# Patient Record
Sex: MALE | Race: WHITE | NOT HISPANIC OR LATINO | Employment: OTHER | URBAN - METROPOLITAN AREA
[De-identification: names, ages, dates, MRNs, and addresses within clinical notes are randomized per-mention and may not be internally consistent; named-entity substitution may affect disease eponyms.]

---

## 2017-02-08 ENCOUNTER — TELEPHONE (OUTPATIENT)
Dept: MEDICAL GROUP | Facility: CLINIC | Age: 67
End: 2017-02-08

## 2017-03-09 DIAGNOSIS — I10 ESSENTIAL HYPERTENSION: ICD-10-CM

## 2017-03-09 RX ORDER — AMLODIPINE BESYLATE 5 MG/1
5 TABLET ORAL
Qty: 90 TAB | Refills: 3 | Status: SHIPPED | OUTPATIENT
Start: 2017-03-09 | End: 2017-03-10 | Stop reason: SDUPTHER

## 2017-03-10 ENCOUNTER — TELEPHONE (OUTPATIENT)
Dept: MEDICAL GROUP | Facility: CLINIC | Age: 67
End: 2017-03-10

## 2017-03-10 DIAGNOSIS — I10 ESSENTIAL HYPERTENSION: ICD-10-CM

## 2017-03-10 RX ORDER — AMLODIPINE BESYLATE 5 MG/1
5 TABLET ORAL
Qty: 90 TAB | Refills: 3 | Status: SHIPPED | OUTPATIENT
Start: 2017-03-10 | End: 2018-02-21 | Stop reason: SDUPTHER

## 2017-03-10 NOTE — TELEPHONE ENCOUNTER
Pharmacy requested a refill for amlodipine, which was approved, but it didn't go through to the pharmacy. Please re-send. Please call pt when it has been re-sent and verified that they received it. Thank you.

## 2017-04-03 ENCOUNTER — HOSPITAL ENCOUNTER (OUTPATIENT)
Dept: LAB | Facility: MEDICAL CENTER | Age: 67
End: 2017-04-03
Attending: UROLOGY
Payer: COMMERCIAL

## 2017-04-03 PROCEDURE — 84154 ASSAY OF PSA FREE: CPT

## 2017-04-03 PROCEDURE — 84153 ASSAY OF PSA TOTAL: CPT

## 2017-04-03 PROCEDURE — 36415 COLL VENOUS BLD VENIPUNCTURE: CPT

## 2017-04-05 ENCOUNTER — OFFICE VISIT (OUTPATIENT)
Dept: MEDICAL GROUP | Facility: CLINIC | Age: 67
End: 2017-04-05
Payer: COMMERCIAL

## 2017-04-05 VITALS
BODY MASS INDEX: 28.36 KG/M2 | RESPIRATION RATE: 18 BRPM | SYSTOLIC BLOOD PRESSURE: 150 MMHG | HEIGHT: 71 IN | HEART RATE: 60 BPM | OXYGEN SATURATION: 98 % | WEIGHT: 202.6 LBS | TEMPERATURE: 97.7 F | DIASTOLIC BLOOD PRESSURE: 90 MMHG

## 2017-04-05 DIAGNOSIS — J30.2 SEASONAL ALLERGIC RHINITIS, UNSPECIFIED ALLERGIC RHINITIS TRIGGER: ICD-10-CM

## 2017-04-05 DIAGNOSIS — E78.5 DYSLIPIDEMIA: ICD-10-CM

## 2017-04-05 DIAGNOSIS — R97.20 ELEVATED PSA: ICD-10-CM

## 2017-04-05 LAB
PSA FREE MFR SERPL: 23 %
PSA FREE SERPL-MCNC: 1.5 NG/ML
PSA SERPL-MCNC: 6.5 NG/ML (ref 0–4)

## 2017-04-05 PROCEDURE — 99214 OFFICE O/P EST MOD 30 MIN: CPT | Performed by: INTERNAL MEDICINE

## 2017-04-05 NOTE — MR AVS SNAPSHOT
"Catalino Sladeakiberry   2017 8:00 AM   Office Visit   MRN: [id number]    Department:  [facility]   Dept Phone:  [phone]    Description:  Male : 1950   Provider:  Rafael Liu D.O.           Reason for Visit     Follow-Up 6 months follow up       Allergies as of 2017     Allergen Noted Reactions    Nkda [No Known Drug Allergy] 2009         You were diagnosed with     Elevated PSA   [646944]       Dyslipidemia   [707646]       Seasonal allergic rhinitis, unspecified allergic rhinitis trigger   [1414417]         Vital Signs     Blood Pressure Pulse Temperature Respirations Height Weight    150/90 mmHg 60 36.5 °C (97.7 °F) 18 1.803 m (5' 11\") 91.899 kg (202 lb 9.6 oz)    Body Mass Index Oxygen Saturation Smoking Status             28.27 kg/m2 98% Never Smoker          Basic Information     Date Of Birth Sex Race Ethnicity Preferred Language    1950 Male White Non- English      Problem List              ICD-10-CM Priority Class Noted - Resolved    Seasonal allergies J30.2   9/3/2009 - Present    PE (PHYSICAL EXAM)    9/3/2009 - Present    Screening PSA (prostate specific antigen) Z12.5   9/3/2009 - Present    Dyslipidemia E78.5   9/3/2009 - Present    Elevated PSA R97.20   2015 - Present      Health Maintenance        Date Due Completion Dates    IMM DTaP/Tdap/Td Vaccine (1 - Tdap) 1969 ---    IMM ZOSTER VACCINE 2010 ---    IMM PNEUMOCOCCAL 65+ (ADULT) LOW/MEDIUM RISK SERIES (1 of 2 - PCV13) 2015 ---    COLONOSCOPY 2016 (Done)    Override on 2006: Done ([facility] )            Current Immunizations     No immunizations on file.      Below and/or attached are the medications your provider expects you to take. Review all of your home medications and newly ordered medications with your provider and/or pharmacist. Follow medication instructions as directed by your provider and/or pharmacist. Please keep your medication " list with you and share with your provider. Update the information when medications are discontinued, doses are changed, or new medications (including over-the-counter products) are added; and carry medication information at all times in the event of emergency situations     Allergies:  NKDA - (reactions not documented)               Medications  Valid as of: April 05, 2017 -  8:41 AM    Generic Name Brand Name Tablet Size Instructions for use    AmLODIPine Besylate (Tab) NORVASC 5 MG Take 1 Tab by mouth every day. TAKE 1 TAB BY MOUTH EVERY DAY.        Montelukast Sodium (Tab) SINGULAIR 10 MG Take 1 Tab by mouth every day.        .                 Medicines prescribed today were sent to:     [facility] [address] [place] [address], [place] - [address] [facility]    [address] [facility] [address]    Phone: [phone] Fax: [phone]    Open 24 Hours?: No      Medication refill instructions:       If your prescription bottle indicates you have medication refills left, it is not necessary to call your provider’s office. Please contact your pharmacy and they will refill your medication.    If your prescription bottle indicates you do not have any refills left, you may request refills at any time through one of the following ways: The online [employer] system (except Urgent Care), by calling your provider’s office, or by asking your pharmacy to contact your provider’s office with a refill request. Medication refills are processed only during regular business hours and may not be available until the next business day. Your provider may request additional information or to have a follow-up visit with you prior to refilling your medication.   *Please Note: Medication refills are assigned a new Rx number when refilled electronically. Your pharmacy may indicate that no refills were authorized even though a new prescription for the same medication is available at the pharmacy. Please request  the medicine by name with the pharmacy before contacting your provider for a refill.        Your To Do List     Future Labs/Procedures Complete By Expires    CBC WITH DIFFERENTIAL  As directed 4/6/2018    COMP METABOLIC PANEL  As directed 4/6/2018    LIPID PROFILE  As directed 4/6/2018    PROSTATE SPECIFIC AG DIAGNOSTIC  As directed 4/5/2018         OpenFin Access Code: [email or web address]  Expires: 5/3/2017  8:00 AM    OpenFin  A secure, online tool to manage your health information     [employer]’s OpenFin® is a secure, online tool that connects you to your personalized health information from the privacy of your home -- day or night - making it very easy for you to manage your healthcare. Once the activation process is completed, you can even access your medical information using the OpenFin raulito, which is available for free in the Apple Raulito store or Google Play store.     OpenFin provides the following levels of access (as shown below):   My Chart Features   [facility] Primary Care Doctor [facility]  Specialists [facility]  Urgent  Care [facility]  Primary Care  Doctor   Email your healthcare team securely and privately 24/7 X X X    Manage appointments: schedule your next appointment; view details of past/upcoming appointments X      Request prescription refills. X      View recent personal medical records, including lab and immunizations X X X X   View health record, including health history, allergies, medications X X X X   Read reports about your outpatient visits, procedures, consult and ER notes X X X X   See your discharge summary, which is a recap of your hospital and/or ER visit that includes your diagnosis, lab results, and care plan. X X       How to register for OpenFin:  1. Go to  [email or web address].  2. Click on the Sign Up Now box, which takes you to the New Member Sign Up page. You will need to provide the following information:  a. Enter your OpenFin Access Code exactly as it appears at the top of  this page. (You will not need to use this code after you’ve completed the sign-up process. If you do not sign up before the expiration date, you must request a new code.)   b. Enter your date of birth.   c. Enter your home email address.   d. Click Submit, and follow the next screen’s instructions.  3. Create a Virtual Computer ID. This will be your Virtual Computer login ID and cannot be changed, so think of one that is secure and easy to remember.  4. Create a Virtual Computer password. You can change your password at any time.  5. Enter your Password Reset Question and Answer. This can be used at a later time if you forget your password.   6. Enter your e-mail address. This allows you to receive e-mail notifications when new information is available in Virtual Computer.  7. Click Sign Up. You can now view your health information.    For assistance activating your Virtual Computer account, call [phone]

## 2017-04-06 NOTE — PROGRESS NOTES
CC: Catalino Holloway is a 67 y.o. male is suffering from   Chief Complaint   Patient presents with   • Follow-Up     6 months follow up          SUBJECTIVE:  1. Elevated PSA  Patient's here for follow-up has a history of an elevated PSA is being followed by Dr. Omer Headley, does have follow-up we've discussed the risks and benefits of possible treatment.     2. Dyslipidemia  Patient with a history of dyslipidemia and asked the patient recheck his cholesterol in 6 months    3. Seasonal allergic rhinitis, unspecified allergic rhinitis trigger  Have additionally recommended that the patient undergo CBC CMP testing is having problems seasonal allergies is using Singulair        Past social, family, history:   Social History   Substance Use Topics   • Smoking status: Never Smoker    • Smokeless tobacco: Never Used   • Alcohol Use: Yes      Comment: minimal         MEDICATIONS:    Current outpatient prescriptions:   •  amlodipine (NORVASC) 5 MG Tab, Take 1 Tab by mouth every day. TAKE 1 TAB BY MOUTH EVERY DAY., Disp: 90 Tab, Rfl: 3  •  montelukast (SINGULAIR) 10 MG Tab, Take 1 Tab by mouth every day., Disp: 90 Tab, Rfl: 3    PROBLEMS:  Patient Active Problem List    Diagnosis Date Noted   • Elevated PSA 01/16/2015   • Seasonal allergies 09/03/2009   • PE (PHYSICAL EXAM) 09/03/2009   • Screening PSA (prostate specific antigen) 09/03/2009   • Dyslipidemia 09/03/2009       REVIEW OF SYSTEMS:  Gen.:  No Nausea, Vomiting, fever, Chills.  Heart: No chest pain.  Lungs:  No shortness of Breath.  Psychological: Adan unusual Anxiety depression     PHYSICAL EXAM   Constitutional: Alert, cooperative, not in acute distress.  Cardiovascular:  Rate Rhythm is regular without murmurs rubs clicks.     Thorax & Lungs: Clear to auscultation, no wheezing, rhonchi, or rales  HENT: Normocephalic, Atraumatic.  Eyes: PERRLA, EOMI, Conjunctiva normal.   Neck: Trachia is midline no swelling of the thyroid.   Lymphatic: No  "lymphadenopathy noted.   Neurologic: Alert & oriented x 3, cranial nerves II through XII are intact, Normal motor function, Normal sensory function, No focal deficits noted.   Psychiatric: Affect normal, Judgment normal, Mood normal.     VITAL SIGNS:/90 mmHg  Pulse 60  Temp(Src) 36.5 °C (97.7 °F)  Resp 18  Ht 1.803 m (5' 11\")  Wt 91.899 kg (202 lb 9.6 oz)  BMI 28.27 kg/m2  SpO2 98%    Labs: Reviewed    Assessment:                                                     Plan:    1. Elevated PSA  Elevated PSA, followed by urology  - PROSTATE SPECIFIC AG DIAGNOSTIC; Future    2. Dyslipidemia  Recheck cholesterol  - LIPID PROFILE; Future    3. Seasonal allergic rhinitis, unspecified allergic rhinitis trigger  Continue singular labs ordered for 6 months  - COMP METABOLIC PANEL; Future  - CBC WITH DIFFERENTIAL; Future          "

## 2017-09-07 ENCOUNTER — HOSPITAL ENCOUNTER (OUTPATIENT)
Facility: MEDICAL CENTER | Age: 67
End: 2017-09-07
Payer: COMMERCIAL

## 2017-09-07 LAB
ALBUMIN SERPL BCP-MCNC: 4.2 G/DL (ref 3.2–4.9)
ALBUMIN/GLOB SERPL: 1.5 G/DL
ALP SERPL-CCNC: 48 U/L (ref 30–99)
ALT SERPL-CCNC: 23 U/L (ref 2–50)
ANION GAP SERPL CALC-SCNC: 7 MMOL/L (ref 0–11.9)
AST SERPL-CCNC: 16 U/L (ref 12–45)
BDY FAT % MEASURED: 28 %
BILIRUB SERPL-MCNC: 0.8 MG/DL (ref 0.1–1.5)
BP DIAS: 71 MMHG
BP SYS: 142 MMHG
BUN SERPL-MCNC: 17 MG/DL (ref 8–22)
CALCIUM SERPL-MCNC: 9.3 MG/DL (ref 8.5–10.5)
CHLORIDE SERPL-SCNC: 105 MMOL/L (ref 96–112)
CHOLEST SERPL-MCNC: 162 MG/DL (ref 100–199)
CO2 SERPL-SCNC: 23 MMOL/L (ref 20–33)
CREAT SERPL-MCNC: 0.99 MG/DL (ref 0.5–1.4)
DIABETES HTDIA: NO
EVENT NAME HTEVT: NORMAL
GFR SERPL CREATININE-BSD FRML MDRD: >60 ML/MIN/1.73 M 2
GLOBULIN SER CALC-MCNC: 2.8 G/DL (ref 1.9–3.5)
GLUCOSE SERPL-MCNC: 123 MG/DL (ref 65–99)
HDLC SERPL-MCNC: 45 MG/DL
HYPERTENSION HTHYP: YES
LDLC SERPL CALC-MCNC: 97 MG/DL
POTASSIUM SERPL-SCNC: 4 MMOL/L (ref 3.6–5.5)
PROT SERPL-MCNC: 7 G/DL (ref 6–8.2)
SCREENING LOC CITY HTCIT: NORMAL
SCREENING LOC STATE HTSTA: NORMAL
SCREENING LOCATION HTLOC: NORMAL
SODIUM SERPL-SCNC: 135 MMOL/L (ref 135–145)
SUBSCRIBER ID HTSID: NORMAL
TRIGL SERPL-MCNC: 100 MG/DL (ref 0–149)

## 2017-10-16 ENCOUNTER — HOSPITAL ENCOUNTER (OUTPATIENT)
Dept: LAB | Facility: MEDICAL CENTER | Age: 67
End: 2017-10-16
Attending: INTERNAL MEDICINE
Payer: COMMERCIAL

## 2017-10-16 DIAGNOSIS — R97.20 ELEVATED PSA: ICD-10-CM

## 2017-10-16 DIAGNOSIS — J30.2 SEASONAL ALLERGIC RHINITIS: ICD-10-CM

## 2017-10-16 DIAGNOSIS — E78.5 DYSLIPIDEMIA: ICD-10-CM

## 2017-10-16 LAB
ALBUMIN SERPL BCP-MCNC: 4 G/DL (ref 3.2–4.9)
ALBUMIN/GLOB SERPL: 1.4 G/DL
ALP SERPL-CCNC: 50 U/L (ref 30–99)
ALT SERPL-CCNC: 32 U/L (ref 2–50)
ANION GAP SERPL CALC-SCNC: 7 MMOL/L (ref 0–11.9)
AST SERPL-CCNC: 21 U/L (ref 12–45)
BASOPHILS # BLD AUTO: 0.6 % (ref 0–1.8)
BASOPHILS # BLD: 0.03 K/UL (ref 0–0.12)
BILIRUB SERPL-MCNC: 0.6 MG/DL (ref 0.1–1.5)
BUN SERPL-MCNC: 18 MG/DL (ref 8–22)
CALCIUM SERPL-MCNC: 9 MG/DL (ref 8.5–10.5)
CHLORIDE SERPL-SCNC: 108 MMOL/L (ref 96–112)
CHOLEST SERPL-MCNC: 114 MG/DL (ref 100–199)
CO2 SERPL-SCNC: 23 MMOL/L (ref 20–33)
CREAT SERPL-MCNC: 0.86 MG/DL (ref 0.5–1.4)
EOSINOPHIL # BLD AUTO: 0.09 K/UL (ref 0–0.51)
EOSINOPHIL NFR BLD: 1.9 % (ref 0–6.9)
ERYTHROCYTE [DISTWIDTH] IN BLOOD BY AUTOMATED COUNT: 43.8 FL (ref 35.9–50)
GFR SERPL CREATININE-BSD FRML MDRD: >60 ML/MIN/1.73 M 2
GLOBULIN SER CALC-MCNC: 2.9 G/DL (ref 1.9–3.5)
GLUCOSE SERPL-MCNC: 104 MG/DL (ref 65–99)
HCT VFR BLD AUTO: 44.5 % (ref 42–52)
HDLC SERPL-MCNC: 42 MG/DL
HGB BLD-MCNC: 15.9 G/DL (ref 14–18)
IMM GRANULOCYTES # BLD AUTO: 0.01 K/UL (ref 0–0.11)
IMM GRANULOCYTES NFR BLD AUTO: 0.2 % (ref 0–0.9)
LDLC SERPL CALC-MCNC: 60 MG/DL
LYMPHOCYTES # BLD AUTO: 1.27 K/UL (ref 1–4.8)
LYMPHOCYTES NFR BLD: 27.1 % (ref 22–41)
MCH RBC QN AUTO: 33.1 PG (ref 27–33)
MCHC RBC AUTO-ENTMCNC: 35.7 G/DL (ref 33.7–35.3)
MCV RBC AUTO: 92.5 FL (ref 81.4–97.8)
MONOCYTES # BLD AUTO: 0.8 K/UL (ref 0–0.85)
MONOCYTES NFR BLD AUTO: 17.1 % (ref 0–13.4)
NEUTROPHILS # BLD AUTO: 2.49 K/UL (ref 1.82–7.42)
NEUTROPHILS NFR BLD: 53.1 % (ref 44–72)
NRBC # BLD AUTO: 0 K/UL
NRBC BLD AUTO-RTO: 0 /100 WBC
PLATELET # BLD AUTO: 181 K/UL (ref 164–446)
PMV BLD AUTO: 9.8 FL (ref 9–12.9)
POTASSIUM SERPL-SCNC: 4.1 MMOL/L (ref 3.6–5.5)
PROT SERPL-MCNC: 6.9 G/DL (ref 6–8.2)
PSA SERPL-MCNC: 6.89 NG/ML (ref 0–4)
RBC # BLD AUTO: 4.81 M/UL (ref 4.7–6.1)
SODIUM SERPL-SCNC: 138 MMOL/L (ref 135–145)
TRIGL SERPL-MCNC: 60 MG/DL (ref 0–149)
WBC # BLD AUTO: 4.7 K/UL (ref 4.8–10.8)

## 2017-10-16 PROCEDURE — 80061 LIPID PANEL: CPT

## 2017-10-16 PROCEDURE — 80053 COMPREHEN METABOLIC PANEL: CPT

## 2017-10-16 PROCEDURE — 84153 ASSAY OF PSA TOTAL: CPT

## 2017-10-16 PROCEDURE — 36415 COLL VENOUS BLD VENIPUNCTURE: CPT

## 2017-10-16 PROCEDURE — 85025 COMPLETE CBC W/AUTO DIFF WBC: CPT

## 2017-10-17 ENCOUNTER — OFFICE VISIT (OUTPATIENT)
Dept: MEDICAL GROUP | Facility: CLINIC | Age: 67
End: 2017-10-17
Payer: COMMERCIAL

## 2017-10-17 VITALS
SYSTOLIC BLOOD PRESSURE: 114 MMHG | TEMPERATURE: 97.4 F | DIASTOLIC BLOOD PRESSURE: 70 MMHG | WEIGHT: 209 LBS | HEART RATE: 66 BPM | RESPIRATION RATE: 18 BRPM | OXYGEN SATURATION: 97 % | BODY MASS INDEX: 29.26 KG/M2 | HEIGHT: 71 IN

## 2017-10-17 DIAGNOSIS — R97.20 ELEVATED PSA: ICD-10-CM

## 2017-10-17 DIAGNOSIS — R89.9 ABNORMAL LABORATORY TEST: ICD-10-CM

## 2017-10-17 PROCEDURE — 99213 OFFICE O/P EST LOW 20 MIN: CPT | Performed by: INTERNAL MEDICINE

## 2017-10-17 ASSESSMENT — PATIENT HEALTH QUESTIONNAIRE - PHQ9: CLINICAL INTERPRETATION OF PHQ2 SCORE: 0

## 2017-10-17 NOTE — PROGRESS NOTES
CC: Catalino Holloway is a 67 y.o. male is suffering from   Chief Complaint   Patient presents with   • Follow-Up         SUBJECTIVE:  1. Abnormal laboratory test  Patient's here for follow-up, we've discussed that he has modestly abnormal CBC including elevated monocytes, we have discussed that he in fact was sick over the weekend. Possibly causing lab to be an error I recommended that we recheck this in 3 months.    2. Elevated PSA  Patient suffers from an elevated PSA is being followed locally by Dr. Omer Headley    3. Dyslipidemia  Patient with excellent cholesterol, recommended no medical therapy for this Previous diagnosis of dyslipidemia was removed from the chart.        Past social, family, history:   Social History   Substance Use Topics   • Smoking status: Never Smoker   • Smokeless tobacco: Never Used   • Alcohol use Yes      Comment: minimal         MEDICATIONS:    Current Outpatient Prescriptions:   •  amlodipine (NORVASC) 5 MG Tab, Take 1 Tab by mouth every day. TAKE 1 TAB BY MOUTH EVERY DAY., Disp: 90 Tab, Rfl: 3  •  montelukast (SINGULAIR) 10 MG Tab, Take 1 Tab by mouth every day., Disp: 90 Tab, Rfl: 3    PROBLEMS:  Patient Active Problem List    Diagnosis Date Noted   • Elevated PSA 01/16/2015   • Seasonal allergies 09/03/2009   • PE (PHYSICAL EXAM) 09/03/2009   • Screening PSA (prostate specific antigen) 09/03/2009   • Dyslipidemia 09/03/2009       REVIEW OF SYSTEMS:  Gen.:  No Nausea, Vomiting, fever, Chills.  Heart: No chest pain.  Lungs:  No shortness of Breath.  Psychological: Adan unusual Anxiety depression     PHYSICAL EXAM   Constitutional: Alert, cooperative, not in acute distress.  Cardiovascular:  Rate Rhythm is regular without murmurs rubs clicks.     Thorax & Lungs: Clear to auscultation, no wheezing, rhonchi, or rales  HENT: Normocephalic, Atraumatic.  Eyes: PERRLA, EOMI, Conjunctiva normal.   Neck: Trachia is midline no swelling of the thyroid.   Lymphatic: No  "lymphadenopathy noted.   Neurologic: Alert & oriented x 3, cranial nerves II through XII are intact, Normal motor function, Normal sensory function, No focal deficits noted.   Psychiatric: Affect normal, Judgment normal, Mood normal.     VITAL SIGNS:/70   Pulse 66   Temp 36.3 °C (97.4 °F)   Resp 18   Ht 1.803 m (5' 11\")   Wt 94.8 kg (209 lb)   SpO2 97%   BMI 29.15 kg/m²     Labs: Reviewed    Assessment:                                                     Plan:    1. Abnormal laboratory test  Abnormal CBC specifically monocytes discussed and reviewed with the patient  - CBC WITH DIFFERENTIAL; Future    2. Elevated PSA  Elevated PSA followed by urology.    "

## 2018-02-21 DIAGNOSIS — I10 ESSENTIAL HYPERTENSION: ICD-10-CM

## 2018-02-21 RX ORDER — AMLODIPINE BESYLATE 5 MG/1
TABLET ORAL
Qty: 90 TAB | Refills: 3 | Status: SHIPPED | OUTPATIENT
Start: 2018-02-21 | End: 2018-10-11

## 2018-06-04 ENCOUNTER — APPOINTMENT (OUTPATIENT)
Dept: RADIOLOGY | Facility: MEDICAL CENTER | Age: 68
End: 2018-06-04
Attending: EMERGENCY MEDICINE
Payer: COMMERCIAL

## 2018-06-04 ENCOUNTER — HOSPITAL ENCOUNTER (EMERGENCY)
Facility: MEDICAL CENTER | Age: 68
End: 2018-06-04
Attending: EMERGENCY MEDICINE
Payer: COMMERCIAL

## 2018-06-04 VITALS
TEMPERATURE: 99 F | BODY MASS INDEX: 27.95 KG/M2 | OXYGEN SATURATION: 94 % | RESPIRATION RATE: 20 BRPM | SYSTOLIC BLOOD PRESSURE: 165 MMHG | WEIGHT: 206.35 LBS | DIASTOLIC BLOOD PRESSURE: 101 MMHG | HEART RATE: 70 BPM | HEIGHT: 72 IN

## 2018-06-04 DIAGNOSIS — S81.011A KNEE LACERATION, RIGHT, INITIAL ENCOUNTER: ICD-10-CM

## 2018-06-04 PROCEDURE — 73130 X-RAY EXAM OF HAND: CPT | Mod: LT

## 2018-06-04 PROCEDURE — 90715 TDAP VACCINE 7 YRS/> IM: CPT | Performed by: EMERGENCY MEDICINE

## 2018-06-04 PROCEDURE — 700111 HCHG RX REV CODE 636 W/ 250 OVERRIDE (IP): Performed by: EMERGENCY MEDICINE

## 2018-06-04 PROCEDURE — 99284 EMERGENCY DEPT VISIT MOD MDM: CPT

## 2018-06-04 PROCEDURE — 304999 HCHG REPAIR-SIMPLE/INTERMED LEVEL 1

## 2018-06-04 PROCEDURE — 305308 HCHG STAPLER,SKIN,DISP.

## 2018-06-04 PROCEDURE — 73562 X-RAY EXAM OF KNEE 3: CPT | Mod: RT

## 2018-06-04 PROCEDURE — 90471 IMMUNIZATION ADMIN: CPT

## 2018-06-04 RX ADMIN — CLOSTRIDIUM TETANI TOXOID ANTIGEN (FORMALDEHYDE INACTIVATED), CORYNEBACTERIUM DIPHTHERIAE TOXOID ANTIGEN (FORMALDEHYDE INACTIVATED), BORDETELLA PERTUSSIS TOXOID ANTIGEN (GLUTARALDEHYDE INACTIVATED), BORDETELLA PERTUSSIS FILAMENTOUS HEMAGGLUTININ ANTIGEN (FORMALDEHYDE INACTIVATED), BORDETELLA PERTUSSIS PERTACTIN ANTIGEN, AND BORDETELLA PERTUSSIS FIMBRIAE 2/3 ANTIGEN 0.5 ML: 5; 2; 2.5; 5; 3; 5 INJECTION, SUSPENSION INTRAMUSCULAR at 21:52

## 2018-06-04 ASSESSMENT — PAIN SCALES - GENERAL: PAINLEVEL_OUTOF10: 3

## 2018-06-05 ENCOUNTER — PATIENT OUTREACH (OUTPATIENT)
Dept: HEALTH INFORMATION MANAGEMENT | Facility: OTHER | Age: 68
End: 2018-06-05

## 2018-06-05 NOTE — PROGRESS NOTES
Placed discharge outreach phone call to patient s/p ER discharge 6/4/18.  Left voicemail providing my contact information and instructions to call with any questions or concerns.

## 2018-06-05 NOTE — DISCHARGE INSTRUCTIONS
Laceration Care, Adult  A laceration is a cut that goes through all of the layers of the skin and into the tissue that is right under the skin. Some lacerations heal on their own. Others need to be closed with stitches (sutures), staples, skin adhesive strips, or skin glue. Proper laceration care minimizes the risk of infection and helps the laceration to heal better.  HOW TO CARE FOR YOUR LACERATION  If sutures or staples were used:  · Keep the wound clean and dry.  · If you were given a bandage (dressing), you should change it at least one time per day or as told by your health care provider. You should also change it if it becomes wet or dirty.  · Keep the wound completely dry for the first 24 hours or as told by your health care provider. After that time, you may shower or bathe. However, make sure that the wound is not soaked in water until after the sutures or staples have been removed.  · Clean the wound one time each day or as told by your health care provider:  ¨ Wash the wound with soap and water.  ¨ Rinse the wound with water to remove all soap.  ¨ Pat the wound dry with a clean towel. Do not rub the wound.  · After cleaning the wound, apply a thin layer of antibiotic ointment as told by your health care provider. This will help to prevent infection and keep the dressing from sticking to the wound.  · Have the sutures or staples removed as told by your health care provider.  If skin adhesive strips were used:  · Keep the wound clean and dry.  · If you were given a bandage (dressing), you should change it at least one time per day or as told by your health care provider. You should also change it if it becomes dirty or wet.  · Do not get the skin adhesive strips wet. You may shower or bathe, but be careful to keep the wound dry.  · If the wound gets wet, pat it dry with a clean towel. Do not rub the wound.  · Skin adhesive strips fall off on their own. You may trim the strips as the wound heals. Do not  remove skin adhesive strips that are still stuck to the wound. They will fall off in time.  If skin glue was used:  · Try to keep the wound dry, but you may briefly wet it in the shower or bath. Do not soak the wound in water, such as by swimming.  · After you have showered or bathed, gently pat the wound dry with a clean towel. Do not rub the wound.  · Do not do any activities that will make you sweat heavily until the skin glue has fallen off on its own.  · Do not apply liquid, cream, or ointment medicine to the wound while the skin glue is in place. Using those may loosen the film before the wound has healed.  · If you were given a bandage (dressing), you should change it at least one time per day or as told by your health care provider. You should also change it if it becomes dirty or wet.  · If a dressing is placed over the wound, be careful not to apply tape directly over the skin glue. Doing that may cause the glue to be pulled off before the wound has healed.  · Do not pick at the glue. The skin glue usually remains in place for 5-10 days, then it falls off of the skin.  General Instructions  · Take over-the-counter and prescription medicines only as told by your health care provider.  · If you were prescribed an antibiotic medicine or ointment, take or apply it as told by your doctor. Do not stop using it even if your condition improves.  · To help prevent scarring, make sure to cover your wound with sunscreen whenever you are outside after stitches are removed, after adhesive strips are removed, or when glue remains in place and the wound is healed. Make sure to wear a sunscreen of at least 30 SPF.  · Do not scratch or pick at the wound.  · Keep all follow-up visits as told by your health care provider. This is important.  · Check your wound every day for signs of infection. Watch for:  ¨ Redness, swelling, or pain.  ¨ Fluid, blood, or pus.  · Raise (elevate) the injured area above the level of your heart  while you are sitting or lying down, if possible.  SEEK MEDICAL CARE IF:  · You received a tetanus shot and you have swelling, severe pain, redness, or bleeding at the injection site.  · You have a fever.  · A wound that was closed breaks open.  · You notice a bad smell coming from your wound or your dressing.  · You notice something coming out of the wound, such as wood or glass.  · Your pain is not controlled with medicine.  · You have increased redness, swelling, or pain at the site of your wound.  · You have fluid, blood, or pus coming from your wound.  · You notice a change in the color of your skin near your wound.  · You need to change the dressing frequently due to fluid, blood, or pus draining from the wound.  · You develop a new rash.  · You develop numbness around the wound.  SEEK IMMEDIATE MEDICAL CARE IF:  · You develop severe swelling around the wound.  · Your pain suddenly increases and is severe.  · You develop painful lumps near the wound or on skin that is anywhere on your body.  · You have a red streak going away from your wound.  · The wound is on your hand or foot and you cannot properly move a finger or toe.  · The wound is on your hand or foot and you notice that your fingers or toes look pale or bluish.     This information is not intended to replace advice given to you by your health care provider. Make sure you discuss any questions you have with your health care provider.     Document Released: 12/18/2006 Document Revised: 05/03/2016 Document Reviewed: 12/14/2015  [employer] Interactive Patient Education ©2016 [employer]
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
## 2018-06-05 NOTE — ED NOTES
Discharge instructions provided.  Pt verbalized the understanding of discharge instructions to follow up in 10 days for suture removal and to return to ER if condition worsens.  Pt ambulated out of ER without difficulty.
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
## 2018-06-15 ENCOUNTER — OFFICE VISIT (OUTPATIENT)
Dept: MEDICAL GROUP | Facility: CLINIC | Age: 68
End: 2018-06-15
Payer: COMMERCIAL

## 2018-06-15 VITALS
RESPIRATION RATE: 16 BRPM | TEMPERATURE: 97.6 F | HEIGHT: 72 IN | DIASTOLIC BLOOD PRESSURE: 96 MMHG | HEART RATE: 68 BPM | OXYGEN SATURATION: 98 % | SYSTOLIC BLOOD PRESSURE: 142 MMHG | BODY MASS INDEX: 27.58 KG/M2 | WEIGHT: 203.6 LBS

## 2018-06-15 DIAGNOSIS — S81.001D OPEN WOUND OF RIGHT KNEE, SUBSEQUENT ENCOUNTER: ICD-10-CM

## 2018-06-15 DIAGNOSIS — Z13.21 ENCOUNTER FOR VITAMIN DEFICIENCY SCREENING: ICD-10-CM

## 2018-06-15 DIAGNOSIS — Z13.220 SCREENING CHOLESTEROL LEVEL: ICD-10-CM

## 2018-06-15 DIAGNOSIS — Z13.29 SCREENING FOR THYROID DISORDER: ICD-10-CM

## 2018-06-15 DIAGNOSIS — M79.674 TOE PAIN, CHRONIC, RIGHT: ICD-10-CM

## 2018-06-15 DIAGNOSIS — G89.29 TOE PAIN, CHRONIC, RIGHT: ICD-10-CM

## 2018-06-15 DIAGNOSIS — R97.20 ELEVATED PSA: ICD-10-CM

## 2018-06-15 DIAGNOSIS — E55.9 VITAMIN D DEFICIENCY: ICD-10-CM

## 2018-06-15 PROCEDURE — 99214 OFFICE O/P EST MOD 30 MIN: CPT | Performed by: INTERNAL MEDICINE

## 2018-06-15 NOTE — LETTER
[facility]  Rafael Liu D.O.  [id number]  [place] [address]  Fax: [phone]   Authorization for Release/Disclosure of   Protected Health Information   Name: CATALINO GONZALEZ : 1950 SSN: [id number]   Address: [address] [place] [address]  [place] [address] Phone:    [phone] (home)    I authorize the entity listed below to release/disclose the PHI below to:  [facility].    Provider or Entity Name:  [facility]   Address   City, State, Zip   Phone:      Fax:     Reason for request: continuity of care   Information to be released:    [  ] LAST COLONOSCOPY,  including any PATH REPORT and follow-up  [  ] LAST FIT/COLOGUARD RESULT [  ] LAST DEXA  [  ] LAST MAMMOGRAM  [  ] LAST PAP  [  ] LAST LABS [  ] RETINA EXAM REPORT  [  ] IMMUNIZATION RECORDS  [ x ] Release all info      [  ] Check here and initial the line next to each item to release ALL health information INCLUDING  _____ Care and treatment for drug and / or alcohol abuse  _____ HIV testing, infection status, or AIDS  _____ Genetic Testing    DATES OF SERVICE OR TIME PERIOD TO BE DISCLOSED: _____________  I understand and acknowledge that:  * This Authorization may be revoked at any time by you in writing, except if your health information has already been used or disclosed.  * Your health information that will be used or disclosed as a result of you signing this authorization could be re-disclosed by the recipient. If this occurs, your re-disclosed health information may no longer be protected by State or Federal laws.  * You may refuse to sign this Authorization. Your refusal will not affect your ability to obtain treatment.  * This Authorization becomes effective upon signing and will  on (date) __________.      If no date is indicated, this Authorization will  one (1) year from the signature date.    Name: Catalino Gonzalez    Signature:   Date:     6/15/2018       PLEASE FAX REQUESTED  RECORDS BACK TO: [phone]

## 2018-08-24 DIAGNOSIS — J30.2 SEASONAL ALLERGIC RHINITIS, UNSPECIFIED TRIGGER: ICD-10-CM

## 2018-08-24 RX ORDER — MONTELUKAST SODIUM 10 MG/1
10 TABLET ORAL DAILY
Qty: 90 TAB | Refills: 3 | Status: SHIPPED | OUTPATIENT
Start: 2018-08-24 | End: 2020-08-17 | Stop reason: SDUPTHER

## 2018-08-24 NOTE — TELEPHONE ENCOUNTER
Was the patient seen in the last year in this department? Yes    Does patient have an active prescription for medications requested? No     Received Request Via: Patient     Last visit:6/15/18

## 2018-10-05 ENCOUNTER — OFFICE VISIT (OUTPATIENT)
Dept: URGENT CARE | Facility: CLINIC | Age: 68
End: 2018-10-05
Payer: COMMERCIAL

## 2018-10-05 ENCOUNTER — TELEPHONE (OUTPATIENT)
Dept: MEDICAL GROUP | Facility: LAB | Age: 68
End: 2018-10-05

## 2018-10-05 VITALS
HEIGHT: 72 IN | WEIGHT: 195 LBS | RESPIRATION RATE: 16 BRPM | OXYGEN SATURATION: 97 % | DIASTOLIC BLOOD PRESSURE: 84 MMHG | TEMPERATURE: 98.6 F | SYSTOLIC BLOOD PRESSURE: 132 MMHG | BODY MASS INDEX: 26.41 KG/M2 | HEART RATE: 70 BPM

## 2018-10-05 DIAGNOSIS — G56.01 CARPAL TUNNEL SYNDROME OF RIGHT WRIST: ICD-10-CM

## 2018-10-05 PROCEDURE — 99213 OFFICE O/P EST LOW 20 MIN: CPT | Performed by: PHYSICIAN ASSISTANT

## 2018-10-05 RX ORDER — METHYLPREDNISOLONE 4 MG/1
TABLET ORAL
Qty: 21 TAB | Refills: 0 | Status: SHIPPED | OUTPATIENT
Start: 2018-10-05 | End: 2019-02-08

## 2018-10-05 ASSESSMENT — ENCOUNTER SYMPTOMS
CHILLS: 0
TINGLING: 1
WHEEZING: 0
FEVER: 0
SENSORY CHANGE: 1
MYALGIAS: 1
COUGH: 0
SHORTNESS OF BREATH: 0
PALPITATIONS: 0

## 2018-10-05 ASSESSMENT — PATIENT HEALTH QUESTIONNAIRE - PHQ9: CLINICAL INTERPRETATION OF PHQ2 SCORE: 0

## 2018-10-05 NOTE — TELEPHONE ENCOUNTER
1. Caller Name: Catalino Holloway                                         Call Back Number: [phone] (home)       Patient approves a detailed voicemail message: yes    2. What are the patient's symptoms (location & severity)? Hand pain and Numbness - hard time sleeping because of it    3. Is this a new symptom Yes    4. When did it start? 1 month ago    5. Action taken per Active Symptom Guide: Urgent Care recommended    6. Patient agrees to recommended action per Active Symptom Escalation Protocol. I helped him make an appointment with Urgent Care at [facility].  I also made him a follow up appointment with Dr. Liu for 10/11 Thursday and added him to the cancellation list.

## 2018-10-05 NOTE — PROGRESS NOTES
Subjective:      Catalino Holloway is a 68 y.o. male who presents with Hand Pain (x1 wk, every night, right hand, tingling in index and middle fingers, pain in palm, pt. states this goes away if does excercising gestures.)            Patient presents with hand pain in right hand for 1 month. Intermittent tingling and numbness in 1st, 2nd and 3rd digit for several months. He complains of worsening pain, tingling and numbness in the first 3 digits. He complains of more pain at night. Worsening with repetitive motion- patient has been doing some wiring on the house several weeks. He denies any specific injury. He has no erythema or edema.       Past Medical History:   Diagnosis Date   • Contact with and exposure to tuberculosis    • Hemorrhoid    • Knee pain, right    • Measles    • Mumps        Past Surgical History:   Procedure Laterality Date   • VASECTOMY         Family History   Problem Relation Age of Onset   • Other Father 32        MVA   • Other Brother 9        brain tumor       Allergies   Allergen Reactions   • Nkda [No Known Drug Allergy]        Medications, Allergies, and current problem list reviewed today in Epic    Review of Systems   Constitutional: Negative for chills, fever and malaise/fatigue.   Respiratory: Negative for cough, shortness of breath and wheezing.    Cardiovascular: Negative for chest pain, palpitations and leg swelling.   Musculoskeletal: Positive for joint pain and myalgias.   Skin:        No erythema or edema    Neurological: Positive for tingling and sensory change.     All other systems reviewed and are negative.        Objective:     /84 (BP Location: Right arm, Patient Position: Sitting, BP Cuff Size: Adult)   Pulse 70   Temp 37 °C (98.6 °F) (Temporal)   Resp 16   Ht 1.829 m (6')   Wt 88.5 kg (195 lb)   SpO2 97%   BMI 26.45 kg/m²      Physical Exam   Constitutional: He is oriented to person, place, and time. He appears well-developed and well-nourished. No  distress.   HENT:   Head: Normocephalic and atraumatic.   Pulmonary/Chest: Effort normal. No respiratory distress.   Musculoskeletal:        Right hand: He exhibits normal range of motion, no tenderness, no bony tenderness, normal capillary refill, no deformity, no laceration and no swelling. Normal sensation noted. Normal strength noted.   No TTP of right hand. Phalen's test positive for Carpal tunnel syndrome   Neurological: He is alert and oriented to person, place, and time. No cranial nerve deficit.   Psychiatric: He has a normal mood and affect. His behavior is normal. Judgment and thought content normal.               Assessment/Plan:     1. Carpal tunnel syndrome of right wrist    - MethylPREDNISolone (MEDROL DOSEPAK) 4 MG Tablet Therapy Pack; Take as directed on package. 1 pack.  Dispense: 21 Tab; Refill: 0    Wrist brace given in office.  Encouraged RICE  AVS printed with home-care instructions discussed.     Differential diagnoses, Supportive care, and indications for immediate follow-up discussed with patient.   Instructed to return to clinic or nearest emergency department for any change in condition, further concerns, or worsening of symptoms.    The patient demonstrated a good understanding and agreed with the treatment plan.    Salome Tilley P.A.-C.

## 2018-10-05 NOTE — PATIENT INSTRUCTIONS
Carpal Tunnel Syndrome  Introduction  Carpal tunnel syndrome is a condition that causes pain in your hand and arm. The carpal tunnel is a narrow area that is on the palm side of your wrist. Repeated wrist motion or certain diseases may cause swelling in the tunnel. This swelling can pinch the main nerve in the wrist (median nerve).  Follow these instructions at home:  If you have a splint:  · Wear it as told by your doctor. Remove it only as told by your doctor.  · Loosen the splint if your fingers:  ¨ Become numb and tingle.  ¨ Turn blue and cold.  · Keep the splint clean and dry.  General instructions  · Take over-the-counter and prescription medicines only as told by your doctor.  · Rest your wrist from any activity that may be causing your pain. If needed, talk to your employer about changes that can be made in your work, such as getting a wrist pad to use while typing.  · If directed, apply ice to the painful area:  ¨ Put ice in a plastic bag.  ¨ Place a towel between your skin and the bag.  ¨ Leave the ice on for 20 minutes, 2-3 times per day.  · Keep all follow-up visits as told by your doctor. This is important.  · Do any exercises as told by your doctor, physical therapist, or occupational therapist.  Contact a doctor if:  · You have new symptoms.  · Medicine does not help your pain.  · Your symptoms get worse.  This information is not intended to replace advice given to you by your health care provider. Make sure you discuss any questions you have with your health care provider.  Document Released: 12/06/2012 Document Revised: 05/25/2017 Document Reviewed: 05/04/2016  © 2017 [employer]

## 2018-10-11 ENCOUNTER — OFFICE VISIT (OUTPATIENT)
Dept: MEDICAL GROUP | Facility: LAB | Age: 68
End: 2018-10-11
Payer: COMMERCIAL

## 2018-10-11 VITALS
DIASTOLIC BLOOD PRESSURE: 80 MMHG | OXYGEN SATURATION: 94 % | TEMPERATURE: 97.9 F | WEIGHT: 197 LBS | BODY MASS INDEX: 26.68 KG/M2 | HEART RATE: 61 BPM | SYSTOLIC BLOOD PRESSURE: 130 MMHG | HEIGHT: 72 IN | RESPIRATION RATE: 12 BRPM

## 2018-10-11 DIAGNOSIS — M79.641 RIGHT HAND PAIN: ICD-10-CM

## 2018-10-11 DIAGNOSIS — I10 ESSENTIAL HYPERTENSION: ICD-10-CM

## 2018-10-11 PROCEDURE — 99213 OFFICE O/P EST LOW 20 MIN: CPT | Performed by: INTERNAL MEDICINE

## 2018-10-11 RX ORDER — AMLODIPINE BESYLATE 2.5 MG/1
2.5 TABLET ORAL DAILY
Qty: 90 TAB | Refills: 3 | Status: SHIPPED | OUTPATIENT
Start: 2018-10-11 | End: 2019-02-20

## 2018-10-11 NOTE — PROGRESS NOTES
CC: Catalino Holloway is a 68 y.o. male is suffering from   Chief Complaint   Patient presents with   • Follow-Up     recent uc visit for possible CTS         SUBJECTIVE:  1. Right hand pain  Catalino is here for follow-up possible referral written to orthopedics.    2. Essential hypertension  Patient with essential hypertension good control recommended we cut his amlodipine down to 2.5 mg each day        Past social, family, history:   Social History   Substance Use Topics   • Smoking status: Never Smoker   • Smokeless tobacco: Never Used   • Alcohol use Yes      Comment: minimal         MEDICATIONS:    Current Outpatient Prescriptions:   •  amLODIPine (NORVASC) 2.5 MG Tab, Take 1 Tab by mouth every day., Disp: 90 Tab, Rfl: 3  •  MethylPREDNISolone (MEDROL DOSEPAK) 4 MG Tablet Therapy Pack, Take as directed on package. 1 pack., Disp: 21 Tab, Rfl: 0  •  montelukast (SINGULAIR) 10 MG Tab, Take 1 Tab by mouth every day., Disp: 90 Tab, Rfl: 3    PROBLEMS:  Patient Active Problem List    Diagnosis Date Noted   • Elevated PSA 01/16/2015   • Seasonal allergies 09/03/2009   • PE (PHYSICAL EXAM) 09/03/2009   • Screening PSA (prostate specific antigen) 09/03/2009       REVIEW OF SYSTEMS:  Gen.:  No Nausea, Vomiting, fever, Chills.  Heart: No chest pain.  Lungs:  No shortness of Breath.  Psychological: Adan unusual Anxiety depression     PHYSICAL EXAM   Constitutional: Alert, cooperative, not in acute distress.  Cardiovascular:  Rate Rhythm is regular without murmurs rubs clicks.     Thorax & Lungs: Clear to auscultation, no wheezing, rhonchi, or rales  HENT: Normocephalic, Atraumatic.  Eyes: PERRLA, EOMI, Conjunctiva normal.   Neck: Trachia is midline no swelling of the thyroid.   Neurologic: Alert & oriented x 3, cranial nerves II through XII are intact, Normal motor function, Normal sensory function, No focal deficits noted.   Psychiatric: Affect normal, Judgment normal, Mood normal.     VITAL SIGNS:/80    Pulse 61   Temp 36.6 °C (97.9 °F) (Temporal)   Resp 12   Ht 1.829 m (6')   Wt 89.4 kg (197 lb)   SpO2 94%   BMI 26.72 kg/m²     Labs: Reviewed    Assessment:                                                     Plan:    1. Right hand pain  Referral written, suspect carpal tunnel syndrome  - REFERRAL TO ORTHOPEDICS    2. Essential hypertension  Decrease amlodipine from 5 mg down to 2.5 mg consider stopping medication  - amLODIPine (NORVASC) 2.5 MG Tab; Take 1 Tab by mouth every day.  Dispense: 90 Tab; Refill: 3

## 2018-10-23 ENCOUNTER — HOSPITAL ENCOUNTER (OUTPATIENT)
Facility: MEDICAL CENTER | Age: 68
End: 2018-10-23
Payer: COMMERCIAL

## 2018-10-23 LAB
BDY FAT % MEASURED: 26.5 %
BP DIAS: 86 MMHG
BP SYS: 130 MMHG
CHOLEST SERPL-MCNC: 203 MG/DL (ref 100–199)
DIABETES HTDIA: NO
EVENT NAME HTEVT: NORMAL
FASTING STATUS PATIENT QL REPORTED: NORMAL
GLUCOSE SERPL-MCNC: 134 MG/DL (ref 65–99)
HDLC SERPL-MCNC: 59 MG/DL
HYPERTENSION HTHYP: NO
LDLC SERPL CALC-MCNC: 121 MG/DL
SCREENING LOC CITY HTCIT: NORMAL
SCREENING LOC STATE HTSTA: NORMAL
SCREENING LOCATION HTLOC: NORMAL
SUBSCRIBER ID HTSID: NORMAL
TRIGL SERPL-MCNC: 114 MG/DL (ref 0–149)

## 2018-11-19 ENCOUNTER — HOSPITAL ENCOUNTER (OUTPATIENT)
Dept: LAB | Facility: MEDICAL CENTER | Age: 68
End: 2018-11-19
Attending: INTERNAL MEDICINE
Payer: COMMERCIAL

## 2018-11-19 DIAGNOSIS — Z13.29 SCREENING FOR THYROID DISORDER: ICD-10-CM

## 2018-11-19 DIAGNOSIS — R97.20 ELEVATED PSA: ICD-10-CM

## 2018-11-19 DIAGNOSIS — Z13.21 ENCOUNTER FOR VITAMIN DEFICIENCY SCREENING: ICD-10-CM

## 2018-11-19 DIAGNOSIS — E55.9 VITAMIN D DEFICIENCY: ICD-10-CM

## 2018-11-19 DIAGNOSIS — Z13.220 SCREENING CHOLESTEROL LEVEL: ICD-10-CM

## 2018-11-19 LAB
25(OH)D3 SERPL-MCNC: 20 NG/ML (ref 30–100)
BASOPHILS # BLD AUTO: 1.1 % (ref 0–1.8)
BASOPHILS # BLD: 0.06 K/UL (ref 0–0.12)
EOSINOPHIL # BLD AUTO: 0.11 K/UL (ref 0–0.51)
EOSINOPHIL NFR BLD: 1.9 % (ref 0–6.9)
ERYTHROCYTE [DISTWIDTH] IN BLOOD BY AUTOMATED COUNT: 43.1 FL (ref 35.9–50)
HCT VFR BLD AUTO: 45.6 % (ref 42–52)
HGB BLD-MCNC: 16.4 G/DL (ref 14–18)
IMM GRANULOCYTES # BLD AUTO: 0.01 K/UL (ref 0–0.11)
IMM GRANULOCYTES NFR BLD AUTO: 0.2 % (ref 0–0.9)
LYMPHOCYTES # BLD AUTO: 1.72 K/UL (ref 1–4.8)
LYMPHOCYTES NFR BLD: 30.2 % (ref 22–41)
MCH RBC QN AUTO: 33.2 PG (ref 27–33)
MCHC RBC AUTO-ENTMCNC: 36 G/DL (ref 33.7–35.3)
MCV RBC AUTO: 92.3 FL (ref 81.4–97.8)
MONOCYTES # BLD AUTO: 0.58 K/UL (ref 0–0.85)
MONOCYTES NFR BLD AUTO: 10.2 % (ref 0–13.4)
NEUTROPHILS # BLD AUTO: 3.21 K/UL (ref 1.82–7.42)
NEUTROPHILS NFR BLD: 56.4 % (ref 44–72)
NRBC # BLD AUTO: 0 K/UL
NRBC BLD-RTO: 0 /100 WBC
PLATELET # BLD AUTO: 177 K/UL (ref 164–446)
PMV BLD AUTO: 9.8 FL (ref 9–12.9)
PSA SERPL-MCNC: 6.88 NG/ML (ref 0–4)
RBC # BLD AUTO: 4.94 M/UL (ref 4.7–6.1)
T4 FREE SERPL-MCNC: 0.72 NG/DL (ref 0.53–1.43)
TSH SERPL DL<=0.005 MIU/L-ACNC: 2.66 UIU/ML (ref 0.38–5.33)
WBC # BLD AUTO: 5.7 K/UL (ref 4.8–10.8)

## 2018-11-19 PROCEDURE — 84443 ASSAY THYROID STIM HORMONE: CPT | Mod: GA

## 2018-11-19 PROCEDURE — 84153 ASSAY OF PSA TOTAL: CPT | Mod: GA

## 2018-11-19 PROCEDURE — 36415 COLL VENOUS BLD VENIPUNCTURE: CPT

## 2018-11-19 PROCEDURE — 85025 COMPLETE CBC W/AUTO DIFF WBC: CPT

## 2018-11-19 PROCEDURE — 80061 LIPID PANEL: CPT | Mod: GA

## 2018-11-19 PROCEDURE — 80053 COMPREHEN METABOLIC PANEL: CPT

## 2018-11-19 PROCEDURE — 82306 VITAMIN D 25 HYDROXY: CPT

## 2018-11-19 PROCEDURE — 84439 ASSAY OF FREE THYROXINE: CPT | Mod: GA

## 2018-11-20 LAB
ALBUMIN SERPL BCP-MCNC: 4.3 G/DL (ref 3.2–4.9)
ALBUMIN/GLOB SERPL: 1.7 G/DL
ALP SERPL-CCNC: 50 U/L (ref 30–99)
ALT SERPL-CCNC: 26 U/L (ref 2–50)
ANION GAP SERPL CALC-SCNC: 8 MMOL/L (ref 0–11.9)
AST SERPL-CCNC: 16 U/L (ref 12–45)
BILIRUB SERPL-MCNC: 0.8 MG/DL (ref 0.1–1.5)
BUN SERPL-MCNC: 20 MG/DL (ref 8–22)
CALCIUM SERPL-MCNC: 9.6 MG/DL (ref 8.5–10.5)
CHLORIDE SERPL-SCNC: 108 MMOL/L (ref 96–112)
CHOLEST SERPL-MCNC: 183 MG/DL (ref 100–199)
CO2 SERPL-SCNC: 25 MMOL/L (ref 20–33)
CREAT SERPL-MCNC: 1.19 MG/DL (ref 0.5–1.4)
FASTING STATUS PATIENT QL REPORTED: NORMAL
GLOBULIN SER CALC-MCNC: 2.6 G/DL (ref 1.9–3.5)
GLUCOSE SERPL-MCNC: 149 MG/DL (ref 65–99)
HDLC SERPL-MCNC: 46 MG/DL
LDLC SERPL CALC-MCNC: 115 MG/DL
POTASSIUM SERPL-SCNC: 4.2 MMOL/L (ref 3.6–5.5)
PROT SERPL-MCNC: 6.9 G/DL (ref 6–8.2)
SODIUM SERPL-SCNC: 141 MMOL/L (ref 135–145)
TRIGL SERPL-MCNC: 110 MG/DL (ref 0–149)

## 2018-11-21 ENCOUNTER — TELEPHONE (OUTPATIENT)
Dept: MEDICAL GROUP | Facility: LAB | Age: 68
End: 2018-11-21

## 2018-11-22 NOTE — TELEPHONE ENCOUNTER
Phone Number Called: [phone] (home)     Message: I left a voicemail to please call us back regarding your x-ray results 11/20/18. Per Dr. Liu requested to mail results/letter to the patient.      Left Message for patient to call back: yes

## 2018-12-11 ENCOUNTER — OFFICE VISIT (OUTPATIENT)
Dept: MEDICAL GROUP | Facility: LAB | Age: 68
End: 2018-12-11
Payer: COMMERCIAL

## 2018-12-11 VITALS
HEIGHT: 72 IN | OXYGEN SATURATION: 94 % | BODY MASS INDEX: 26.95 KG/M2 | RESPIRATION RATE: 12 BRPM | DIASTOLIC BLOOD PRESSURE: 72 MMHG | SYSTOLIC BLOOD PRESSURE: 120 MMHG | TEMPERATURE: 98.3 F | WEIGHT: 199 LBS | HEART RATE: 74 BPM

## 2018-12-11 DIAGNOSIS — R73.02 IGT (IMPAIRED GLUCOSE TOLERANCE): ICD-10-CM

## 2018-12-11 DIAGNOSIS — N40.1 BENIGN PROSTATIC HYPERPLASIA WITH LOWER URINARY TRACT SYMPTOMS, SYMPTOM DETAILS UNSPECIFIED: ICD-10-CM

## 2018-12-11 DIAGNOSIS — G89.29 CHRONIC PAIN OF LEFT THUMB: ICD-10-CM

## 2018-12-11 DIAGNOSIS — H61.21 IMPACTED CERUMEN OF RIGHT EAR: ICD-10-CM

## 2018-12-11 DIAGNOSIS — M79.645 CHRONIC PAIN OF LEFT THUMB: ICD-10-CM

## 2018-12-11 DIAGNOSIS — E55.9 VITAMIN D DEFICIENCY: ICD-10-CM

## 2018-12-11 LAB
HBA1C MFR BLD: 6.3 % (ref ?–5.8)
INT CON NEG: ABNORMAL
INT CON POS: ABNORMAL

## 2018-12-11 PROCEDURE — 83036 HEMOGLOBIN GLYCOSYLATED A1C: CPT | Performed by: INTERNAL MEDICINE

## 2018-12-11 PROCEDURE — 99214 OFFICE O/P EST MOD 30 MIN: CPT | Performed by: INTERNAL MEDICINE

## 2018-12-12 NOTE — PROGRESS NOTES
CC: Catalino Holloway is a 68 y.o. male is suffering from   Chief Complaint   Patient presents with   • Follow-Up     2 months         SUBJECTIVE:  1. Chronic pain of left thumb  Catalino is here for follow-up has chronic left thumb pain x-ray ordered    2. Impacted cerumen of right ear  Cerumen impaction right ear removed in the office    3. Benign prostatic hyperplasia with lower urinary tract symptoms, symptom details unspecified  BPH no change in medical therapy patient's to follow-up with Dr. Zachary Headley    4. Vitamin D deficiency  Recheck vitamin D    5. 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 Nature Made makes a good vitamin., Disp: 1 Each, Rfl: 0  •  amLODIPine (NORVASC) 2.5 MG Tab, Take 1 Tab by mouth every day., Disp: 90 Tab, Rfl: 3  •  MethylPREDNISolone (MEDROL DOSEPAK) 4 MG Tablet Therapy Pack, Take as directed on package. 1 pack., Disp: 21 Tab, Rfl: 0  •  montelukast (SINGULAIR) 10 MG Tab, Take 1 Tab by mouth every day., Disp: 90 Tab, Rfl: 3    PROBLEMS:  Patient Active Problem List    Diagnosis Date Noted   • Elevated PSA 01/16/2015   • Seasonal allergies 09/03/2009   • PE (PHYSICAL EXAM) 09/03/2009   • Screening PSA (prostate specific antigen) 09/03/2009       REVIEW OF SYSTEMS:  Gen.:  No Nausea, Vomiting, fever, Chills.  Heart: No chest pain.  Lungs:  No shortness of Breath.  Psychological: Adan unusual Anxiety depression     PHYSICAL EXAM   Constitutional: Alert, cooperative, not in acute distress.  Cardiovascular:  Rate Rhythm is regular without  murmurs rubs clicks.     Thorax & Lungs: Clear to auscultation, no wheezing, rhonchi, or rales  HENT: Normocephalic, Atraumatic.  Eyes: PERRLA, EOMI, Conjunctiva normal.   Neck: Trachia is midline no swelling of the thyroid.   Lymphatic: No lymphadenopathy noted.   Neurologic: Alert & oriented x 3, cranial nerves II through XII are intact, Normal motor function, Normal sensory function, No focal deficits noted.   Psychiatric: Affect normal, Judgment normal, Mood normal.     VITAL SIGNS:/72   Pulse 74   Temp 36.8 °C (98.3 °F) (Temporal)   Resp 12   Ht 1.829 m (6')   Wt 90.3 kg (199 lb)   SpO2 94%   BMI 26.99 kg/m²     Labs: Reviewed    Assessment:                                                     Plan:    1. Chronic pain of left thumb  X-ray ordered hand patient with pain at the thumb base or lateral wrist  - DX-HAND 3+ LEFT; Future    2. Impacted cerumen of right ear  Removed in the office    3. Benign prostatic hyperplasia with lower urinary tract symptoms, symptom details unspecified  Patient is to follow-up with Dr. Omer Headley    4. Vitamin D deficiency  Recheck vitamin D encourage patient be on 2000 international units  - NON SPECIFIED; Low vitamin D, please start at least 2000 international units of vitamin D3 each day! Nature Made makes a good vitamin.  Dispense: 1 Each; Refill: 0    5. IGT (impaired glucose tolerance)  Hemoglobin A1c is 6.3 basic metabolic panel ordered  - BASIC METABOLIC PANEL; Future  - POCT Hemoglobin A1C

## 2018-12-13 ENCOUNTER — HOSPITAL ENCOUNTER (OUTPATIENT)
Dept: RADIOLOGY | Facility: MEDICAL CENTER | Age: 68
End: 2018-12-13
Attending: INTERNAL MEDICINE
Payer: COMMERCIAL

## 2018-12-13 DIAGNOSIS — M79.645 CHRONIC PAIN OF LEFT THUMB: ICD-10-CM

## 2018-12-13 DIAGNOSIS — G89.29 CHRONIC PAIN OF LEFT THUMB: ICD-10-CM

## 2018-12-13 PROCEDURE — 73130 X-RAY EXAM OF HAND: CPT | Mod: LT

## 2018-12-17 ENCOUNTER — TELEPHONE (OUTPATIENT)
Dept: MEDICAL GROUP | Facility: LAB | Age: 68
End: 2018-12-17

## 2018-12-18 NOTE — TELEPHONE ENCOUNTER
Phone Number Called: [phone] (home)     Message: I left a voicemail to please call us back regarding your Hand xray 12/13/18. Per Dr. Liu requesting test results/letter to be mailed to the patient.    Left Message for patient to call back: yes

## 2019-02-08 ENCOUNTER — OFFICE VISIT (OUTPATIENT)
Dept: MEDICAL GROUP | Facility: LAB | Age: 69
End: 2019-02-08
Payer: COMMERCIAL

## 2019-02-08 VITALS
BODY MASS INDEX: 27.22 KG/M2 | TEMPERATURE: 98.3 F | RESPIRATION RATE: 12 BRPM | OXYGEN SATURATION: 94 % | HEART RATE: 68 BPM | SYSTOLIC BLOOD PRESSURE: 104 MMHG | HEIGHT: 72 IN | WEIGHT: 201 LBS | DIASTOLIC BLOOD PRESSURE: 62 MMHG

## 2019-02-08 DIAGNOSIS — R73.02 IGT (IMPAIRED GLUCOSE TOLERANCE): ICD-10-CM

## 2019-02-08 LAB
HBA1C MFR BLD: 6.3 % (ref ?–5.8)
INT CON NEG: ABNORMAL
INT CON POS: ABNORMAL

## 2019-02-08 PROCEDURE — 99213 OFFICE O/P EST LOW 20 MIN: CPT | Performed by: INTERNAL MEDICINE

## 2019-02-08 PROCEDURE — 83036 HEMOGLOBIN GLYCOSYLATED A1C: CPT | Performed by: INTERNAL MEDICINE

## 2019-02-08 ASSESSMENT — PATIENT HEALTH QUESTIONNAIRE - PHQ9: CLINICAL INTERPRETATION OF PHQ2 SCORE: 0

## 2019-02-09 NOTE — PROGRESS NOTES
CC: Catalino Holloway is a 69 y.o. male is suffering from   Chief Complaint   Patient presents with   • Follow-Up     2 months         SUBJECTIVE:  1. IGT (impaired glucose tolerance)  Catalino is here for follow-up, is heading to [place] to do some los work on his new home.        Past social, family, history:   Social History   Substance Use Topics   • Smoking status: Never Smoker   • Smokeless tobacco: Never Used   • Alcohol use Yes      Comment: minimal         MEDICATIONS:    Current Outpatient Prescriptions:   •  amLODIPine (NORVASC) 2.5 MG Tab, Take 1 Tab by mouth every day., Disp: 90 Tab, Rfl: 3  •  montelukast (SINGULAIR) 10 MG Tab, Take 1 Tab by mouth every day., Disp: 90 Tab, Rfl: 3  •  NON SPECIFIED, Low vitamin D, please start at least 2000 international units of vitamin D3 each day! Nature Made makes a good vitamin., Disp: 1 Each, Rfl: 0    PROBLEMS:  Patient Active Problem List    Diagnosis Date Noted   • Elevated PSA 01/16/2015   • Seasonal allergies 09/03/2009   • PE (PHYSICAL EXAM) 09/03/2009   • Screening PSA (prostate specific antigen) 09/03/2009       REVIEW OF SYSTEMS:  Gen.:  No Nausea, Vomiting, fever, Chills.  Heart: No chest pain.  Lungs:  No shortness of Breath.  Psychological: Adan unusual Anxiety depression     PHYSICAL EXAM   Constitutional: Alert, cooperative, not in acute distress.  Cardiovascular:  Rate Rhythm is regular without murmurs rubs clicks.     Thorax & Lungs: Clear to auscultation, no wheezing, rhonchi, or rales  HENT: Normocephalic, Atraumatic.  Eyes: PERRLA, EOMI, Conjunctiva normal.   Neck: Trachia is midline no swelling of the thyroid.   Lymphatic: No lymphadenopathy noted.   Neurologic: Alert & oriented x 3, cranial nerves II through XII are intact, Normal motor function, Normal sensory function, No focal deficits noted.   Psychiatric: Affect normal, Judgment normal, Mood normal.     VITAL SIGNS:/62   Pulse 68   Temp 36.8 °C (98.3 °F)  (Temporal)   Resp 12   Ht 1.829 m (6')   Wt 91.2 kg (201 lb)   SpO2 94%   BMI 27.26 kg/m²     Labs: Reviewed    Assessment:                                                     Plan:    1. IGT (impaired glucose tolerance)  Recheck hemoglobin A1c basic metabolic panel 3 months  - POCT  A1C  - Basic Metabolic Panel; Future

## 2019-02-20 DIAGNOSIS — I10 ESSENTIAL HYPERTENSION: ICD-10-CM

## 2019-02-20 RX ORDER — AMLODIPINE BESYLATE 5 MG/1
TABLET ORAL
Qty: 90 TAB | Refills: 1 | Status: SHIPPED | OUTPATIENT
Start: 2019-02-20 | End: 2019-08-16 | Stop reason: SDUPTHER

## 2019-02-20 NOTE — TELEPHONE ENCOUNTER
Was the patient seen in the last year in this department? Yes  LOV 2/8/19  Does patient have an active prescription for medications requested? No     Received Request Via: Pharmacy

## 2019-05-24 ENCOUNTER — HOSPITAL ENCOUNTER (OUTPATIENT)
Dept: LAB | Facility: MEDICAL CENTER | Age: 69
End: 2019-05-24
Attending: INTERNAL MEDICINE
Payer: COMMERCIAL

## 2019-05-24 DIAGNOSIS — R73.02 IGT (IMPAIRED GLUCOSE TOLERANCE): ICD-10-CM

## 2019-05-24 LAB
ANION GAP SERPL CALC-SCNC: 6 MMOL/L (ref 0–11.9)
BUN SERPL-MCNC: 28 MG/DL (ref 8–22)
CALCIUM SERPL-MCNC: 9.8 MG/DL (ref 8.5–10.5)
CHLORIDE SERPL-SCNC: 108 MMOL/L (ref 96–112)
CO2 SERPL-SCNC: 23 MMOL/L (ref 20–33)
CREAT SERPL-MCNC: 0.97 MG/DL (ref 0.5–1.4)
FASTING STATUS PATIENT QL REPORTED: NORMAL
GLUCOSE SERPL-MCNC: 123 MG/DL (ref 65–99)
POTASSIUM SERPL-SCNC: 4.5 MMOL/L (ref 3.6–5.5)
SODIUM SERPL-SCNC: 137 MMOL/L (ref 135–145)

## 2019-05-24 PROCEDURE — 80048 BASIC METABOLIC PNL TOTAL CA: CPT

## 2019-05-24 PROCEDURE — 36415 COLL VENOUS BLD VENIPUNCTURE: CPT

## 2019-05-25 ENCOUNTER — TELEPHONE (OUTPATIENT)
Dept: MEDICAL GROUP | Facility: LAB | Age: 69
End: 2019-05-25

## 2019-05-25 NOTE — TELEPHONE ENCOUNTER
Phone Number Called: [phone] (home)     Call outcome: left message for patient to call back regarding message below    Message: I left a voicemail regarding lab results from 5/24/19. Per Dr. Liu requested results/letter to be mailed to the patient.

## 2019-05-28 ENCOUNTER — OFFICE VISIT (OUTPATIENT)
Dept: MEDICAL GROUP | Facility: LAB | Age: 69
End: 2019-05-28
Payer: COMMERCIAL

## 2019-05-28 VITALS
RESPIRATION RATE: 12 BRPM | DIASTOLIC BLOOD PRESSURE: 80 MMHG | HEIGHT: 72 IN | HEART RATE: 62 BPM | BODY MASS INDEX: 26.95 KG/M2 | TEMPERATURE: 98.1 F | WEIGHT: 199 LBS | OXYGEN SATURATION: 97 % | SYSTOLIC BLOOD PRESSURE: 140 MMHG

## 2019-05-28 DIAGNOSIS — R73.02 IGT (IMPAIRED GLUCOSE TOLERANCE): ICD-10-CM

## 2019-05-28 DIAGNOSIS — R97.20 ELEVATED PSA: ICD-10-CM

## 2019-05-28 DIAGNOSIS — F41.9 ANXIETY: ICD-10-CM

## 2019-05-28 PROCEDURE — 99214 OFFICE O/P EST MOD 30 MIN: CPT | Performed by: INTERNAL MEDICINE

## 2019-05-28 NOTE — PROGRESS NOTES
CC: Catalino Holloway is a 69 y.o. male is suffering from   Chief Complaint   Patient presents with   • Elevated Glucose     3 months follow up         SUBJECTIVE:  1. IGT (impaired glucose tolerance)  Catalino is here for follow-up, appears to be doing okay.  Blood sugars fasting of peer to be coming down, patient's working on watching his diet his exercise    2. Elevated PSA  Patient with an elevated PSA at this juncture no change in medical therapy is to follow-up with urology    3. Anxiety  Patient with anxiety, related to stepgrandchildren, stepdaughter stepson who are not being responsible.  Financial issues associated with there constantly needing money      Past social, family, history:   Social History   Substance Use Topics   • Smoking status: Never Smoker   • Smokeless tobacco: Never Used   • Alcohol use Yes      Comment: minimal         MEDICATIONS:    Current Outpatient Prescriptions:   •  aspirin EC (ECOTRIN) 81 MG Tablet Delayed Response, Take 81 mg by mouth every day., Disp: , Rfl:   •  amLODIPine (NORVASC) 5 MG Tab, TAKE 1 TABLET BY MOUTH EVERY DAY, Disp: 90 Tab, Rfl: 1  •  NON SPECIFIED, Low vitamin D, please start at least 2000 international units of vitamin D3 each day! Nature Made makes a good vitamin., Disp: 1 Each, Rfl: 0  •  montelukast (SINGULAIR) 10 MG Tab, Take 1 Tab by mouth every day. (Patient not taking: Reported on 5/28/2019), Disp: 90 Tab, Rfl: 3    PROBLEMS:  Patient Active Problem List    Diagnosis Date Noted   • IGT (impaired glucose tolerance) 05/28/2019   • Elevated PSA 01/16/2015   • Seasonal allergies 09/03/2009   • PE (PHYSICAL EXAM) 09/03/2009   • Screening PSA (prostate specific antigen) 09/03/2009       REVIEW OF SYSTEMS:  Gen.:  No Nausea, Vomiting, fever, Chills.  Heart: No chest pain.  Lungs:  No shortness of Breath.  Psychological: Adan unusual Anxiety depression     PHYSICAL EXAM   Constitutional: Alert, cooperative, not in acute distress.  Cardiovascular:   Rate Rhythm is regular without murmurs rubs clicks.     Thorax & Lungs: Clear to auscultation, no wheezing, rhonchi, or rales  HENT: Normocephalic, Atraumatic.  Eyes: PERRLA, EOMI, Conjunctiva normal.   Neck: Trachia is midline no swelling of the thyroid.   Lymphatic: No lymphadenopathy noted.   Neurologic: Alert & oriented x 3, cranial nerves II through XII are intact, Normal motor function, Normal sensory function, No focal deficits noted.   Psychiatric: Affect normal, Judgment normal, Mood normal.     VITAL SIGNS:/80   Pulse 62   Temp 36.7 °C (98.1 °F) (Temporal)   Resp 12   Ht 1.829 m (6')   Wt 90.3 kg (199 lb)   SpO2 97%   BMI 26.99 kg/m²     Labs: Reviewed    Assessment:                                                     Plan:    1. IGT (impaired glucose tolerance)  Impaired glucose tolerance recheck conference metabolic panel CBC hemoglobin A1c 6 months  - Comp Metabolic Panel; Future  - CBC WITH DIFFERENTIAL; Future  - HEMOGLOBIN A1C; Future    2. Elevated PSA  History of elevated PSA follow-up with urology recheck PSA  - PROSTATE SPECIFIC AG DIAGNOSTIC; Future    3. Anxiety  Anxiety related to step children stepgrandchildren, no change in medical therapy patient is dealing with a very appropriately.

## 2019-08-16 DIAGNOSIS — I10 ESSENTIAL HYPERTENSION: ICD-10-CM

## 2019-08-16 RX ORDER — AMLODIPINE BESYLATE 5 MG/1
TABLET ORAL
Qty: 90 TAB | Refills: 0 | Status: SHIPPED | OUTPATIENT
Start: 2019-08-16 | End: 2020-02-19 | Stop reason: SDUPTHER

## 2019-11-22 ENCOUNTER — HOSPITAL ENCOUNTER (OUTPATIENT)
Dept: LAB | Facility: MEDICAL CENTER | Age: 69
End: 2019-11-22
Attending: INTERNAL MEDICINE
Payer: COMMERCIAL

## 2019-11-22 DIAGNOSIS — R73.02 IGT (IMPAIRED GLUCOSE TOLERANCE): ICD-10-CM

## 2019-11-22 DIAGNOSIS — R97.20 ELEVATED PSA: ICD-10-CM

## 2019-11-22 LAB
ALBUMIN SERPL BCP-MCNC: 4.7 G/DL (ref 3.2–4.9)
ALBUMIN/GLOB SERPL: 2 G/DL
ALP SERPL-CCNC: 57 U/L (ref 30–99)
ALT SERPL-CCNC: 29 U/L (ref 2–50)
ANION GAP SERPL CALC-SCNC: 11 MMOL/L (ref 0–11.9)
AST SERPL-CCNC: 19 U/L (ref 12–45)
BASOPHILS # BLD AUTO: 1.2 % (ref 0–1.8)
BASOPHILS # BLD: 0.08 K/UL (ref 0–0.12)
BILIRUB SERPL-MCNC: 0.9 MG/DL (ref 0.1–1.5)
BUN SERPL-MCNC: 20 MG/DL (ref 8–22)
CALCIUM SERPL-MCNC: 9.4 MG/DL (ref 8.5–10.5)
CHLORIDE SERPL-SCNC: 109 MMOL/L (ref 96–112)
CO2 SERPL-SCNC: 23 MMOL/L (ref 20–33)
CREAT SERPL-MCNC: 0.99 MG/DL (ref 0.5–1.4)
EOSINOPHIL # BLD AUTO: 0.17 K/UL (ref 0–0.51)
EOSINOPHIL NFR BLD: 2.4 % (ref 0–6.9)
ERYTHROCYTE [DISTWIDTH] IN BLOOD BY AUTOMATED COUNT: 44 FL (ref 35.9–50)
FASTING STATUS PATIENT QL REPORTED: NORMAL
GLOBULIN SER CALC-MCNC: 2.3 G/DL (ref 1.9–3.5)
GLUCOSE SERPL-MCNC: 131 MG/DL (ref 65–99)
HCT VFR BLD AUTO: 48.9 % (ref 42–52)
HGB BLD-MCNC: 17 G/DL (ref 14–18)
IMM GRANULOCYTES # BLD AUTO: 0.01 K/UL (ref 0–0.11)
IMM GRANULOCYTES NFR BLD AUTO: 0.1 % (ref 0–0.9)
LYMPHOCYTES # BLD AUTO: 2.06 K/UL (ref 1–4.8)
LYMPHOCYTES NFR BLD: 29.6 % (ref 22–41)
MCH RBC QN AUTO: 32.5 PG (ref 27–33)
MCHC RBC AUTO-ENTMCNC: 34.8 G/DL (ref 33.7–35.3)
MCV RBC AUTO: 93.5 FL (ref 81.4–97.8)
MONOCYTES # BLD AUTO: 0.55 K/UL (ref 0–0.85)
MONOCYTES NFR BLD AUTO: 7.9 % (ref 0–13.4)
NEUTROPHILS # BLD AUTO: 4.08 K/UL (ref 1.82–7.42)
NEUTROPHILS NFR BLD: 58.8 % (ref 44–72)
NRBC # BLD AUTO: 0 K/UL
NRBC BLD-RTO: 0 /100 WBC
PLATELET # BLD AUTO: 216 K/UL (ref 164–446)
PMV BLD AUTO: 9.5 FL (ref 9–12.9)
POTASSIUM SERPL-SCNC: 4.2 MMOL/L (ref 3.6–5.5)
PROT SERPL-MCNC: 7 G/DL (ref 6–8.2)
PSA SERPL-MCNC: 7.64 NG/ML (ref 0–4)
RBC # BLD AUTO: 5.23 M/UL (ref 4.7–6.1)
SODIUM SERPL-SCNC: 143 MMOL/L (ref 135–145)
WBC # BLD AUTO: 7 K/UL (ref 4.8–10.8)

## 2019-11-22 PROCEDURE — 36415 COLL VENOUS BLD VENIPUNCTURE: CPT

## 2019-11-22 PROCEDURE — 80053 COMPREHEN METABOLIC PANEL: CPT

## 2019-11-22 PROCEDURE — 85025 COMPLETE CBC W/AUTO DIFF WBC: CPT

## 2019-11-22 PROCEDURE — 84153 ASSAY OF PSA TOTAL: CPT

## 2019-11-22 PROCEDURE — 83036 HEMOGLOBIN GLYCOSYLATED A1C: CPT

## 2019-11-23 DIAGNOSIS — R97.20 ELEVATED PSA: ICD-10-CM

## 2019-11-23 LAB
EST. AVERAGE GLUCOSE BLD GHB EST-MCNC: 148 MG/DL
HBA1C MFR BLD: 6.8 % (ref 0–5.6)

## 2019-11-25 ENCOUNTER — OFFICE VISIT (OUTPATIENT)
Dept: MEDICAL GROUP | Facility: LAB | Age: 69
End: 2019-11-25
Payer: COMMERCIAL

## 2019-11-25 VITALS
OXYGEN SATURATION: 98 % | BODY MASS INDEX: 27.01 KG/M2 | HEART RATE: 67 BPM | SYSTOLIC BLOOD PRESSURE: 150 MMHG | DIASTOLIC BLOOD PRESSURE: 76 MMHG | TEMPERATURE: 98 F | RESPIRATION RATE: 12 BRPM | HEIGHT: 72 IN | WEIGHT: 199.4 LBS

## 2019-11-25 DIAGNOSIS — E11.9 CONTROLLED TYPE 2 DIABETES MELLITUS WITHOUT COMPLICATION, WITH LONG-TERM CURRENT USE OF INSULIN (HCC): ICD-10-CM

## 2019-11-25 DIAGNOSIS — R23.8 OTHER SKIN CHANGES: ICD-10-CM

## 2019-11-25 DIAGNOSIS — Z79.4 CONTROLLED TYPE 2 DIABETES MELLITUS WITHOUT COMPLICATION, WITH LONG-TERM CURRENT USE OF INSULIN (HCC): ICD-10-CM

## 2019-11-25 DIAGNOSIS — Z12.11 SCREEN FOR COLON CANCER: ICD-10-CM

## 2019-11-25 PROCEDURE — 99214 OFFICE O/P EST MOD 30 MIN: CPT | Performed by: INTERNAL MEDICINE

## 2019-11-25 NOTE — PROGRESS NOTES
CC: Catalino Holloway is a 69 y.o. male is suffering from   Chief Complaint   Patient presents with   • Abnormal Labs     6 months follow up         SUBJECTIVE:  1. Controlled type 2 diabetes mellitus without complication, with long-term current use of insulin (HCC)  Catalino is here for follow-up has controlled type 2 diabetes indication started    2. Screen for colon cancer  Patient is in need of screening for colon cancer orders written    3. Other skin changes  Skin changes will refer to dermatology        Past social, family, history:   Social History     Tobacco Use   • Smoking status: Never Smoker   • Smokeless tobacco: Never Used   Substance Use Topics   • Alcohol use: Yes     Comment: minimal   • Drug use: Not on file         MEDICATIONS:    Current Outpatient Medications:   •  metFORMIN (GLUCOPHAGE) 500 MG Tab, Take 1 Tab by mouth every day., Disp: 90 Tab, Rfl: 3  •  amLODIPine (NORVASC) 5 MG Tab, TAKE 1 TABLET BY MOUTH EVERY DAY, Disp: 90 Tab, Rfl: 0  •  aspirin EC (ECOTRIN) 81 MG Tablet Delayed Response, Take 81 mg by mouth every day., Disp: , Rfl:   •  NON SPECIFIED, Low vitamin D, please start at least 2000 international units of vitamin D3 each day! Nature Made makes a good vitamin., Disp: 1 Each, Rfl: 0  •  montelukast (SINGULAIR) 10 MG Tab, Take 1 Tab by mouth every day. (Patient not taking: Reported on 5/28/2019), Disp: 90 Tab, Rfl: 3    PROBLEMS:  Patient Active Problem List    Diagnosis Date Noted   • IGT (impaired glucose tolerance) 05/28/2019   • Elevated PSA 01/16/2015   • Seasonal allergies 09/03/2009   • PE (PHYSICAL EXAM) 09/03/2009   • Screening PSA (prostate specific antigen) 09/03/2009       REVIEW OF SYSTEMS:  Gen.:  No Nausea, Vomiting, fever, Chills.  Heart: No chest pain.  Lungs:  No shortness of Breath.  Psychological: Adan unusual Anxiety depression     PHYSICAL EXAM   Constitutional: Alert, cooperative, not in acute distress.  Cardiovascular:  Rate Rhythm is regular  without murmurs rubs clicks.     Thorax & Lungs: Clear to auscultation, no wheezing, rhonchi, or rales  HENT: Normocephalic, Atraumatic.  Eyes: PERRLA, EOMI, Conjunctiva normal.   Neck: Trachia is midline no swelling of the thyroid.   Lymphatic: No lymphadenopathy noted.   Abdomin: Soft non-tender, no rebound, no guarding.   Skin: Likely senile keratoses back.   Neurologic: Alert & oriented x 3, cranial nerves II through XII are intact, Normal motor function, Normal sensory function, No focal deficits noted.   Psychiatric: Affect normal, Judgment normal, Mood normal.     VITAL SIGNS:/76   Pulse 67   Temp 36.7 °C (98 °F) (Temporal)   Resp 12   Ht 1.829 m (6')   Wt 90.4 kg (199 lb 6.4 oz)   SpO2 98%   BMI 27.04 kg/m²     Labs: Reviewed    Assessment:                                                     Plan:    1. Controlled type 2 diabetes mellitus without complication, with long-term current use of insulin (HCC)  Start metformin check hemoglobin A1c 3 months  - metFORMIN (GLUCOPHAGE) 500 MG Tab; Take 1 Tab by mouth every day.  Dispense: 90 Tab; Refill: 3  - HEMOGLOBIN A1C; Future    2. Screen for colon cancer  Referral written  - REFERRAL TO GASTROENTEROLOGY    3. Other skin changes  Referral written  - REFERRAL TO DERMATOLOGY

## 2020-01-17 ENCOUNTER — OFFICE VISIT (OUTPATIENT)
Dept: MEDICAL GROUP | Facility: LAB | Age: 70
End: 2020-01-17
Payer: MEDICARE

## 2020-01-17 VITALS
DIASTOLIC BLOOD PRESSURE: 80 MMHG | SYSTOLIC BLOOD PRESSURE: 136 MMHG | HEART RATE: 68 BPM | WEIGHT: 199.4 LBS | RESPIRATION RATE: 12 BRPM | HEIGHT: 72 IN | BODY MASS INDEX: 27.01 KG/M2 | OXYGEN SATURATION: 95 % | TEMPERATURE: 98.1 F

## 2020-01-17 DIAGNOSIS — Z79.4 CONTROLLED TYPE 2 DIABETES MELLITUS WITHOUT COMPLICATION, WITH LONG-TERM CURRENT USE OF INSULIN (HCC): ICD-10-CM

## 2020-01-17 DIAGNOSIS — E11.9 CONTROLLED TYPE 2 DIABETES MELLITUS WITHOUT COMPLICATION, WITH LONG-TERM CURRENT USE OF INSULIN (HCC): ICD-10-CM

## 2020-01-17 DIAGNOSIS — J02.9 PHARYNGITIS, UNSPECIFIED ETIOLOGY: ICD-10-CM

## 2020-01-17 DIAGNOSIS — R53.83 FATIGUE, UNSPECIFIED TYPE: ICD-10-CM

## 2020-01-17 LAB
INT CON NEG: NORMAL
INT CON POS: NORMAL
S PYO AG THROAT QL: NEGATIVE

## 2020-01-17 PROCEDURE — 99214 OFFICE O/P EST MOD 30 MIN: CPT | Performed by: INTERNAL MEDICINE

## 2020-01-17 PROCEDURE — 87880 STREP A ASSAY W/OPTIC: CPT | Performed by: INTERNAL MEDICINE

## 2020-01-17 ASSESSMENT — PATIENT HEALTH QUESTIONNAIRE - PHQ9: CLINICAL INTERPRETATION OF PHQ2 SCORE: 0

## 2020-01-18 NOTE — PROGRESS NOTES
CC: Catalino Holloway is a 69 y.o. male is suffering from   Chief Complaint   Patient presents with   • URI     x 2 weeks with cough and sore throat, sinus discharge         SUBJECTIVE:  1. Pharyngitis, unspecified etiology  Patient is here for follow-up, probable pharyngitis likely viral, rapid strep completed and is negative    2. Fatigue, unspecified type  Fatigue etiology uncertain associated with this    3. Controlled type 2 diabetes mellitus without complication, with long-term current use of insulin ([facility])  Labs ordered        Past social, family, history: Wife is present  Social History     Tobacco Use   • Smoking status: Never Smoker   • Smokeless tobacco: Never Used   Substance Use Topics   • Alcohol use: Yes     Comment: minimal   • Drug use: Not on file         MEDICATIONS:    Current Outpatient Medications:   •  metFORMIN (GLUCOPHAGE) 500 MG Tab, Take 1 Tab by mouth every day., Disp: 90 Tab, Rfl: 3  •  amLODIPine (NORVASC) 5 MG Tab, TAKE 1 TABLET BY MOUTH EVERY DAY, Disp: 90 Tab, Rfl: 0  •  aspirin EC (ECOTRIN) 81 MG Tablet Delayed Response, Take 81 mg by mouth every day., Disp: , Rfl:   •  NON SPECIFIED, Low vitamin D, please start at least 2000 international units of vitamin D3 each day! Nature Made makes a good vitamin., Disp: 1 Each, Rfl: 0  •  montelukast (SINGULAIR) 10 MG Tab, Take 1 Tab by mouth every day. (Patient not taking: Reported on 5/28/2019), Disp: 90 Tab, Rfl: 3    PROBLEMS:  Patient Active Problem List    Diagnosis Date Noted   • Controlled type 2 diabetes mellitus without complication, with long-term current use of insulin ([facility]) 11/25/2019   • IGT (impaired glucose tolerance) 05/28/2019   • Elevated PSA 01/16/2015   • Seasonal allergies 09/03/2009   • PE (PHYSICAL EXAM) 09/03/2009   • Screening PSA (prostate specific antigen) 09/03/2009       REVIEW OF SYSTEMS:  Gen.:  No Nausea, Vomiting, fever, Chills.  Heart: No chest pain.  Lungs:  No shortness of Breath.  Psychological: Adan  unusual Anxiety depression     PHYSICAL EXAM   Constitutional: Alert, cooperative, not in acute distress.  Cardiovascular:  Rate Rhythm is regular without murmurs rubs clicks.     Thorax & Lungs: Clear to auscultation, no wheezing, rhonchi, or rales  HENT: Normocephalic, Atraumatic.  Eyes: PERRLA, EOMI, Conjunctiva normal.   Neck: Trachia is midline no swelling of the thyroid.   Lymphatic: No lymphadenopathy noted.   Neurologic: Alert & oriented x 3, cranial nerves II through XII are intact, Normal motor function, Normal sensory function, No focal deficits noted.   Psychiatric: Affect normal, Judgment normal, Mood normal.     VITAL SIGNS:/80   Pulse 68   Temp 36.7 °C (98.1 °F) (Temporal)   Resp 12   Ht 1.829 m (6')   Wt 90.4 kg (199 lb 6.4 oz)   SpO2 95%   BMI 27.04 kg/m²     Labs: Reviewed    Assessment:                                                     Plan:    1. Pharyngitis, unspecified etiology  POCT rapid strep negative  - POCT Rapid Strep A    2. Fatigue, unspecified type  Suspect viral infection    3. Controlled type 2 diabetes mellitus without complication, with long-term current use of insulin ([facility])  Labs ordered for 3 months  - REFERRAL TO OPHTHALMOLOGY  - Diabetic Monofilament Lower Extremity Exam  - Lipid Profile; Future  - MICROALBUMIN CREAT RATIO URINE (LAB COLLECT); Future  - HEMOGLOBIN A1C; Future

## 2020-01-20 ENCOUNTER — TELEPHONE (OUTPATIENT)
Dept: MEDICAL GROUP | Facility: LAB | Age: 70
End: 2020-01-20

## 2020-01-20 DIAGNOSIS — Z12.11 SCREEN FOR COLON CANCER: ICD-10-CM

## 2020-01-20 NOTE — TELEPHONE ENCOUNTER
1. Caller Name: Catalino                                          Call Back Number: [phone]      Patient approves a detailed voicemail message: yes    Catalino is wanting you to order the cologuard test please.

## 2020-01-27 ENCOUNTER — APPOINTMENT (RX ONLY)
Dept: URBAN - METROPOLITAN AREA CLINIC 4 | Facility: CLINIC | Age: 70
Setting detail: DERMATOLOGY
End: 2020-01-27

## 2020-01-27 DIAGNOSIS — D18.0 HEMANGIOMA: ICD-10-CM

## 2020-01-27 DIAGNOSIS — L57.0 ACTINIC KERATOSIS: ICD-10-CM

## 2020-01-27 DIAGNOSIS — L82.1 OTHER SEBORRHEIC KERATOSIS: ICD-10-CM

## 2020-01-27 DIAGNOSIS — Z71.89 OTHER SPECIFIED COUNSELING: ICD-10-CM

## 2020-01-27 DIAGNOSIS — L81.4 OTHER MELANIN HYPERPIGMENTATION: ICD-10-CM

## 2020-01-27 DIAGNOSIS — L72.0 EPIDERMAL CYST: ICD-10-CM

## 2020-01-27 DIAGNOSIS — D22 MELANOCYTIC NEVI: ICD-10-CM

## 2020-01-27 PROBLEM — D18.01 HEMANGIOMA OF SKIN AND SUBCUTANEOUS TISSUE: Status: ACTIVE | Noted: 2020-01-27

## 2020-01-27 PROBLEM — D22.61 MELANOCYTIC NEVI OF RIGHT UPPER LIMB, INCLUDING SHOULDER: Status: ACTIVE | Noted: 2020-01-27

## 2020-01-27 PROBLEM — D22.5 MELANOCYTIC NEVI OF TRUNK: Status: ACTIVE | Noted: 2020-01-27

## 2020-01-27 PROBLEM — D22.62 MELANOCYTIC NEVI OF LEFT UPPER LIMB, INCLUDING SHOULDER: Status: ACTIVE | Noted: 2020-01-27

## 2020-01-27 PROCEDURE — ? REFERRAL CORRESPONDENCE

## 2020-01-27 PROCEDURE — 17000 DESTRUCT PREMALG LESION: CPT

## 2020-01-27 PROCEDURE — 99203 OFFICE O/P NEW LOW 30 MIN: CPT | Mod: 25

## 2020-01-27 PROCEDURE — ? LIQUID NITROGEN

## 2020-01-27 PROCEDURE — ? COUNSELING

## 2020-01-27 PROCEDURE — ? ADDITIONAL NOTES

## 2020-01-27 PROCEDURE — 17003 DESTRUCT PREMALG LES 2-14: CPT

## 2020-01-27 ASSESSMENT — LOCATION DETAILED DESCRIPTION DERM
LOCATION DETAILED: LEFT INFERIOR MEDIAL FOREHEAD
LOCATION DETAILED: RIGHT MID-UPPER BACK
LOCATION DETAILED: LEFT ANTERIOR DISTAL UPPER ARM
LOCATION DETAILED: RIGHT ANTERIOR PROXIMAL UPPER ARM
LOCATION DETAILED: SUPERIOR MID FOREHEAD
LOCATION DETAILED: LEFT RADIAL DORSAL HAND
LOCATION DETAILED: LEFT MEDIAL UPPER BACK
LOCATION DETAILED: EPIGASTRIC SKIN
LOCATION DETAILED: LEFT ANTERIOR PROXIMAL UPPER ARM
LOCATION DETAILED: PERIUMBILICAL SKIN
LOCATION DETAILED: STERNUM
LOCATION DETAILED: NASAL DORSUM
LOCATION DETAILED: SUPERIOR THORACIC SPINE
LOCATION DETAILED: LOWER STERNUM
LOCATION DETAILED: INFERIOR MID FOREHEAD
LOCATION DETAILED: RIGHT SUPERIOR LATERAL UPPER BACK
LOCATION DETAILED: RIGHT ANTERIOR DISTAL UPPER ARM

## 2020-01-27 ASSESSMENT — LOCATION ZONE DERM
LOCATION ZONE: NOSE
LOCATION ZONE: ARM
LOCATION ZONE: FACE
LOCATION ZONE: HAND
LOCATION ZONE: TRUNK

## 2020-01-27 ASSESSMENT — LOCATION SIMPLE DESCRIPTION DERM
LOCATION SIMPLE: ABDOMEN
LOCATION SIMPLE: CHEST
LOCATION SIMPLE: SUPERIOR FOREHEAD
LOCATION SIMPLE: NOSE
LOCATION SIMPLE: RIGHT UPPER BACK
LOCATION SIMPLE: LEFT FOREHEAD
LOCATION SIMPLE: LEFT UPPER BACK
LOCATION SIMPLE: UPPER BACK
LOCATION SIMPLE: LEFT UPPER ARM
LOCATION SIMPLE: RIGHT UPPER ARM
LOCATION SIMPLE: INFERIOR FOREHEAD
LOCATION SIMPLE: LEFT HAND

## 2020-02-11 DIAGNOSIS — R19.5 POSITIVE COLORECTAL CANCER SCREENING USING COLOGUARD TEST: ICD-10-CM

## 2020-02-19 DIAGNOSIS — I10 ESSENTIAL HYPERTENSION: ICD-10-CM

## 2020-02-19 RX ORDER — AMLODIPINE BESYLATE 5 MG/1
5 TABLET ORAL
Qty: 90 TAB | Refills: 3 | Status: SHIPPED | OUTPATIENT
Start: 2020-02-19 | End: 2021-02-25

## 2020-02-19 NOTE — TELEPHONE ENCOUNTER
Received request via: Patient    Was the patient seen in the last year in this department? Yes 1/17/2020    Does the patient have an active prescription (recently filled or refills available) for medication(s) requested? No

## 2020-04-14 ENCOUNTER — HOSPITAL ENCOUNTER (OUTPATIENT)
Dept: LAB | Facility: MEDICAL CENTER | Age: 70
End: 2020-04-14
Attending: INTERNAL MEDICINE
Payer: MEDICARE

## 2020-04-14 DIAGNOSIS — E11.9 CONTROLLED TYPE 2 DIABETES MELLITUS WITHOUT COMPLICATION, WITH LONG-TERM CURRENT USE OF INSULIN (HCC): ICD-10-CM

## 2020-04-14 DIAGNOSIS — Z79.4 CONTROLLED TYPE 2 DIABETES MELLITUS WITHOUT COMPLICATION, WITH LONG-TERM CURRENT USE OF INSULIN (HCC): ICD-10-CM

## 2020-04-14 LAB
CHOLEST SERPL-MCNC: 179 MG/DL (ref 100–199)
CREAT UR-MCNC: 156.92 MG/DL
EST. AVERAGE GLUCOSE BLD GHB EST-MCNC: 128 MG/DL
FASTING STATUS PATIENT QL REPORTED: NORMAL
HBA1C MFR BLD: 6.1 % (ref 0–5.6)
HDLC SERPL-MCNC: 52 MG/DL
LDLC SERPL CALC-MCNC: 113 MG/DL
MICROALBUMIN UR-MCNC: <1.2 MG/DL
MICROALBUMIN/CREAT UR: NORMAL MG/G (ref 0–30)
TRIGL SERPL-MCNC: 68 MG/DL (ref 0–149)

## 2020-04-14 PROCEDURE — 82570 ASSAY OF URINE CREATININE: CPT

## 2020-04-14 PROCEDURE — 36415 COLL VENOUS BLD VENIPUNCTURE: CPT | Mod: GA

## 2020-04-14 PROCEDURE — 80061 LIPID PANEL: CPT

## 2020-04-14 PROCEDURE — 83036 HEMOGLOBIN GLYCOSYLATED A1C: CPT | Mod: GA

## 2020-04-14 PROCEDURE — 82043 UR ALBUMIN QUANTITATIVE: CPT

## 2020-04-15 ENCOUNTER — OFFICE VISIT (OUTPATIENT)
Dept: MEDICAL GROUP | Facility: LAB | Age: 70
End: 2020-04-15
Payer: MEDICARE

## 2020-04-15 VITALS
SYSTOLIC BLOOD PRESSURE: 128 MMHG | HEART RATE: 72 BPM | HEIGHT: 72 IN | WEIGHT: 195.6 LBS | RESPIRATION RATE: 12 BRPM | BODY MASS INDEX: 26.49 KG/M2 | OXYGEN SATURATION: 95 % | DIASTOLIC BLOOD PRESSURE: 74 MMHG | TEMPERATURE: 98.6 F

## 2020-04-15 DIAGNOSIS — Z79.4 CONTROLLED TYPE 2 DIABETES MELLITUS WITHOUT COMPLICATION, WITH LONG-TERM CURRENT USE OF INSULIN (HCC): ICD-10-CM

## 2020-04-15 DIAGNOSIS — R97.20 ELEVATED PSA: ICD-10-CM

## 2020-04-15 DIAGNOSIS — Z91.89 OTHER SPECIFIED PERSONAL RISK FACTORS, NOT ELSEWHERE CLASSIFIED: ICD-10-CM

## 2020-04-15 DIAGNOSIS — E11.9 CONTROLLED TYPE 2 DIABETES MELLITUS WITHOUT COMPLICATION, WITH LONG-TERM CURRENT USE OF INSULIN (HCC): ICD-10-CM

## 2020-04-15 PROCEDURE — 99213 OFFICE O/P EST LOW 20 MIN: CPT | Performed by: INTERNAL MEDICINE

## 2020-04-15 ASSESSMENT — FIBROSIS 4 INDEX: FIB4 SCORE: 1.14

## 2020-04-16 NOTE — PROGRESS NOTES
CC: Catalino Holloway is a 70 y.o. male is suffering from   Chief Complaint   Patient presents with   • Diabetes     3 months follow up         SUBJECTIVE:  1. Other specified personal risk factors, not elsewhere classified  Roscoe is here for follow-up, we have discussed the need for CT cardiac scoring.  Patient is not on a statin medication.  We have discussed CT cardiac scoring will give us a much better perspective as to whether or not he should be on atorvastatin etc.    2. Controlled type 2 diabetes mellitus without complication, with long-term current use of insulin (HCC)  History of very well-controlled type 2 diabetes encourage patient work on his weight loss    3. Elevated PSA  History of elevated PSA orders rewritten        Past social, family, history:   Social History     Tobacco Use   • Smoking status: Never Smoker   • Smokeless tobacco: Never Used   Substance Use Topics   • Alcohol use: Yes     Comment: minimal   • Drug use: Not on file         MEDICATIONS:    Current Outpatient Medications:   •  amLODIPine (NORVASC) 5 MG Tab, Take 1 Tab by mouth every day., Disp: 90 Tab, Rfl: 3  •  metFORMIN (GLUCOPHAGE) 500 MG Tab, Take 1 Tab by mouth every day., Disp: 90 Tab, Rfl: 3  •  aspirin EC (ECOTRIN) 81 MG Tablet Delayed Response, Take 81 mg by mouth every day., Disp: , Rfl:   •  NON SPECIFIED, Low vitamin D, please start at least 2000 international units of vitamin D3 each day! Nature Made makes a good vitamin., Disp: 1 Each, Rfl: 0  •  montelukast (SINGULAIR) 10 MG Tab, Take 1 Tab by mouth every day. (Patient not taking: Reported on 5/28/2019), Disp: 90 Tab, Rfl: 3    PROBLEMS:  Patient Active Problem List    Diagnosis Date Noted   • Controlled type 2 diabetes mellitus without complication, with long-term current use of insulin (HCC) 11/25/2019   • IGT (impaired glucose tolerance) 05/28/2019   • Elevated PSA 01/16/2015   • Seasonal allergies 09/03/2009   • PE (PHYSICAL EXAM) 09/03/2009   •  Screening PSA (prostate specific antigen) 09/03/2009       REVIEW OF SYSTEMS:  Gen.:  No Nausea, Vomiting, fever, Chills.  Heart: No chest pain.  Lungs:  No shortness of Breath.  Psychological: Adan unusual Anxiety depression     PHYSICAL EXAM   Constitutional: Alert, cooperative, not in acute distress.  Cardiovascular:  Rate Rhythm is regular without murmurs rubs clicks.     Thorax & Lungs: Clear to auscultation, no wheezing, rhonchi, or rales  HENT: Normocephalic, Atraumatic.  Eyes: PERRLA, EOMI, Conjunctiva normal.   Neck: Trachia is midline no swelling of the thyroid.   Neurologic: Alert & oriented x 3, cranial nerves II through XII are intact, Normal motor function, Normal sensory function, No focal deficits noted.   Psychiatric: Affect normal, Judgment normal, Mood normal.     VITAL SIGNS:/74   Pulse 72   Temp 37 °C (98.6 °F) (Temporal)   Resp 12   Ht 1.829 m (6')   Wt 88.7 kg (195 lb 9.6 oz)   SpO2 95%   BMI 26.53 kg/m²     Labs: Reviewed    Assessment:                                                     Plan:    1. Other specified personal risk factors, not elsewhere classified  CT cardiac scoring recommended before starting statin drug therapy  - CT-CARDIAC SCORING; Future    2. Controlled type 2 diabetes mellitus without complication, with long-term current use of insulin ([facility])  As detailed above excellent diabetic control discussed that in fact he meets the criteria for being a diabetic  - CT-CARDIAC SCORING; Future  - HEMOGLOBIN A1C; Future    3. Elevated PSA  History of elevated PSA recheck labs.  Consider referral to urology.  Patient will be moving to [place], and commuting between [place].   - PROSTATE SPECIFIC AG DIAGNOSTIC; Future

## 2020-05-20 ENCOUNTER — TELEPHONE (OUTPATIENT)
Dept: MEDICAL GROUP | Facility: LAB | Age: 70
End: 2020-05-20

## 2020-05-20 DIAGNOSIS — M79.644 PAIN OF FINGER OF RIGHT HAND: ICD-10-CM

## 2020-05-20 NOTE — TELEPHONE ENCOUNTER
Karena:  Please call Catalino, referral to Dr. William Esposito has been written!  Regards, Rafael Liu, DO

## 2020-05-20 NOTE — TELEPHONE ENCOUNTER
1. Caller Name: Catalino                         Call Back Number: There are no phone numbers on file.       How would the patient prefer to be contacted with a response: Phone call OK to leave a detailed message    Pt is requesting a referral back to Dr William Esposito for thumb pain.  He went to Dr Esposito before for right thumb pain and now the pain is in his left thumb.

## 2020-06-10 ENCOUNTER — HOSPITAL ENCOUNTER (OUTPATIENT)
Dept: RADIOLOGY | Facility: MEDICAL CENTER | Age: 70
End: 2020-06-10
Attending: INTERNAL MEDICINE
Payer: MEDICARE

## 2020-06-10 DIAGNOSIS — Z79.4 CONTROLLED TYPE 2 DIABETES MELLITUS WITHOUT COMPLICATION, WITH LONG-TERM CURRENT USE OF INSULIN (HCC): ICD-10-CM

## 2020-06-10 DIAGNOSIS — Z91.89 OTHER SPECIFIED PERSONAL RISK FACTORS, NOT ELSEWHERE CLASSIFIED: ICD-10-CM

## 2020-06-10 DIAGNOSIS — E11.9 CONTROLLED TYPE 2 DIABETES MELLITUS WITHOUT COMPLICATION, WITH LONG-TERM CURRENT USE OF INSULIN (HCC): ICD-10-CM

## 2020-06-10 PROCEDURE — 4410556 CT-CARDIAC SCORING

## 2020-08-17 DIAGNOSIS — J30.2 SEASONAL ALLERGIC RHINITIS, UNSPECIFIED TRIGGER: ICD-10-CM

## 2020-08-17 RX ORDER — MONTELUKAST SODIUM 10 MG/1
10 TABLET ORAL DAILY
Qty: 90 TAB | Refills: 3 | Status: SHIPPED | OUTPATIENT
Start: 2020-08-17

## 2020-08-17 NOTE — TELEPHONE ENCOUNTER
Received request via: Patient    Was the patient seen in the last year in this department? Yes  4/15/20  Does the patient have an active prescription (recently filled or refills available) for medication(s) requested? No

## 2020-09-25 ENCOUNTER — APPOINTMENT (OUTPATIENT)
Dept: MEDICAL GROUP | Facility: LAB | Age: 70
End: 2020-09-25
Payer: MEDICARE

## 2020-11-02 DIAGNOSIS — Z79.4 CONTROLLED TYPE 2 DIABETES MELLITUS WITHOUT COMPLICATION, WITH LONG-TERM CURRENT USE OF INSULIN (HCC): ICD-10-CM

## 2020-11-02 DIAGNOSIS — E11.9 CONTROLLED TYPE 2 DIABETES MELLITUS WITHOUT COMPLICATION, WITH LONG-TERM CURRENT USE OF INSULIN (HCC): ICD-10-CM

## 2020-11-02 NOTE — TELEPHONE ENCOUNTER
Received request via: Pharmacy    Was the patient seen in the last year in this department? Yes  4/15/20  Does the patient have an active prescription (recently filled or refills available) for medication(s) requested? No

## 2021-02-25 DIAGNOSIS — I10 ESSENTIAL HYPERTENSION: ICD-10-CM

## 2021-02-25 RX ORDER — AMLODIPINE BESYLATE 5 MG/1
TABLET ORAL
Qty: 90 TABLET | Refills: 3 | Status: SHIPPED | OUTPATIENT
Start: 2021-02-25

## 2021-02-25 NOTE — TELEPHONE ENCOUNTER
Received request via: Pharmacy    Was the patient seen in the last year in this department? Yes  4/15/2020  Does the patient have an active prescription (recently filled or refills available) for medication(s) requested? No

## 2021-11-23 ENCOUNTER — APPOINTMENT (RX ONLY)
Dept: URBAN - METROPOLITAN AREA CLINIC 158 | Facility: CLINIC | Age: 71
Setting detail: DERMATOLOGY
End: 2021-11-23

## 2021-11-23 DIAGNOSIS — L82.1 OTHER SEBORRHEIC KERATOSIS: ICD-10-CM

## 2021-11-23 DIAGNOSIS — D22 MELANOCYTIC NEVI: ICD-10-CM

## 2021-11-23 DIAGNOSIS — L72.0 EPIDERMAL CYST: ICD-10-CM | Status: RESOLVED

## 2021-11-23 PROBLEM — D22.61 MELANOCYTIC NEVI OF RIGHT UPPER LIMB, INCLUDING SHOULDER: Status: ACTIVE | Noted: 2021-11-23

## 2021-11-23 PROBLEM — D22.39 MELANOCYTIC NEVI OF OTHER PARTS OF FACE: Status: ACTIVE | Noted: 2021-11-23

## 2021-11-23 PROCEDURE — 99202 OFFICE O/P NEW SF 15 MIN: CPT

## 2021-11-23 PROCEDURE — ? COUNSELING

## 2021-11-23 PROCEDURE — ? PATIENT SPECIFIC COUNSELING

## 2021-11-23 ASSESSMENT — LOCATION ZONE DERM
LOCATION ZONE: LIP
LOCATION ZONE: ARM
LOCATION ZONE: FACE

## 2021-11-23 ASSESSMENT — LOCATION SIMPLE DESCRIPTION DERM
LOCATION SIMPLE: RIGHT LIP
LOCATION SIMPLE: RIGHT CHEEK
LOCATION SIMPLE: RIGHT SHOULDER

## 2021-11-23 ASSESSMENT — LOCATION DETAILED DESCRIPTION DERM
LOCATION DETAILED: RIGHT UPPER CUTANEOUS LIP
LOCATION DETAILED: RIGHT CENTRAL MALAR CHEEK
LOCATION DETAILED: RIGHT LATERAL MANDIBULAR CHEEK
LOCATION DETAILED: RIGHT POSTERIOR SHOULDER

## 2021-11-23 NOTE — PROCEDURE: PATIENT SPECIFIC COUNSELING
Lesion on right upper lip has resolved since appointment was made. History is consistent with a ruptured cyst which has resolved.  No lesion was seen or palpated on exam.  Return to clinic if lesion recurs.
Detail Level: Detailed